# Patient Record
Sex: MALE | Race: WHITE
[De-identification: names, ages, dates, MRNs, and addresses within clinical notes are randomized per-mention and may not be internally consistent; named-entity substitution may affect disease eponyms.]

---

## 2023-02-01 ENCOUNTER — HOSPITAL ENCOUNTER (OUTPATIENT)
Dept: HOSPITAL 100 - OT | Age: 63
Discharge: HOME | End: 2023-02-01
Payer: MEDICAID

## 2023-02-01 DIAGNOSIS — I89.0: Primary | ICD-10-CM

## 2023-02-01 PROCEDURE — 97530 THERAPEUTIC ACTIVITIES: CPT

## 2023-02-01 PROCEDURE — 97166 OT EVAL MOD COMPLEX 45 MIN: CPT

## 2023-02-24 LAB
ALANINE AMINOTRANSFERASE (SGPT) (U/L) IN SER/PLAS: 30 U/L (ref 10–52)
ALBUMIN (G/DL) IN SER/PLAS: 4.3 G/DL (ref 3.4–5)
ALKALINE PHOSPHATASE (U/L) IN SER/PLAS: 70 U/L (ref 33–136)
ANION GAP IN SER/PLAS: 13 MMOL/L (ref 10–20)
ASPARTATE AMINOTRANSFERASE (SGOT) (U/L) IN SER/PLAS: 28 U/L (ref 9–39)
BASOPHILS (10*3/UL) IN BLOOD BY AUTOMATED COUNT: 0.05 X10E9/L (ref 0–0.1)
BASOPHILS/100 LEUKOCYTES IN BLOOD BY AUTOMATED COUNT: 1.1 % (ref 0–2)
BILIRUBIN TOTAL (MG/DL) IN SER/PLAS: 0.6 MG/DL (ref 0–1.2)
CALCIUM (MG/DL) IN SER/PLAS: 9.7 MG/DL (ref 8.6–10.3)
CARBON DIOXIDE, TOTAL (MMOL/L) IN SER/PLAS: 26 MMOL/L (ref 21–32)
CHLORIDE (MMOL/L) IN SER/PLAS: 101 MMOL/L (ref 98–107)
CHOLESTEROL (MG/DL) IN SER/PLAS: 253 MG/DL (ref 0–199)
CHOLESTEROL IN HDL (MG/DL) IN SER/PLAS: 47 MG/DL
CHOLESTEROL/HDL RATIO: 5.4
COBALAMIN (VITAMIN B12) (PG/ML) IN SER/PLAS: 1309 PG/ML (ref 211–911)
CREATININE (MG/DL) IN SER/PLAS: 1.22 MG/DL (ref 0.5–1.3)
EOSINOPHILS (10*3/UL) IN BLOOD BY AUTOMATED COUNT: 0.13 X10E9/L (ref 0–0.7)
EOSINOPHILS/100 LEUKOCYTES IN BLOOD BY AUTOMATED COUNT: 2.8 % (ref 0–6)
ERYTHROCYTE DISTRIBUTION WIDTH (RATIO) BY AUTOMATED COUNT: 12.5 % (ref 11.5–14.5)
ERYTHROCYTE MEAN CORPUSCULAR HEMOGLOBIN CONCENTRATION (G/DL) BY AUTOMATED: 33.4 G/DL (ref 32–36)
ERYTHROCYTE MEAN CORPUSCULAR VOLUME (FL) BY AUTOMATED COUNT: 96 FL (ref 80–100)
ERYTHROCYTES (10*6/UL) IN BLOOD BY AUTOMATED COUNT: 4.8 X10E12/L (ref 4.5–5.9)
ESTIMATED AVERAGE GLUCOSE FOR HBA1C: 114 MG/DL
GFR MALE: 66 ML/MIN/1.73M2
GLUCOSE (MG/DL) IN SER/PLAS: 98 MG/DL (ref 74–99)
HEMATOCRIT (%) IN BLOOD BY AUTOMATED COUNT: 46.1 % (ref 41–52)
HEMOGLOBIN (G/DL) IN BLOOD: 15.4 G/DL (ref 13.5–17.5)
HEMOGLOBIN A1C/HEMOGLOBIN TOTAL IN BLOOD: 5.6 %
IMMATURE GRANULOCYTES/100 LEUKOCYTES IN BLOOD BY AUTOMATED COUNT: 0 % (ref 0–0.9)
LDL: 182 MG/DL (ref 0–99)
LEUKOCYTES (10*3/UL) IN BLOOD BY AUTOMATED COUNT: 4.6 X10E9/L (ref 4.4–11.3)
LYMPHOCYTES (10*3/UL) IN BLOOD BY AUTOMATED COUNT: 1.21 X10E9/L (ref 1.2–4.8)
LYMPHOCYTES/100 LEUKOCYTES IN BLOOD BY AUTOMATED COUNT: 26.4 % (ref 13–44)
MONOCYTES (10*3/UL) IN BLOOD BY AUTOMATED COUNT: 0.65 X10E9/L (ref 0.1–1)
MONOCYTES/100 LEUKOCYTES IN BLOOD BY AUTOMATED COUNT: 14.2 % (ref 2–10)
NEUTROPHILS (10*3/UL) IN BLOOD BY AUTOMATED COUNT: 2.55 X10E9/L (ref 1.2–7.7)
NEUTROPHILS/100 LEUKOCYTES IN BLOOD BY AUTOMATED COUNT: 55.5 % (ref 40–80)
PLATELETS (10*3/UL) IN BLOOD AUTOMATED COUNT: 225 X10E9/L (ref 150–450)
POTASSIUM (MMOL/L) IN SER/PLAS: 3.6 MMOL/L (ref 3.5–5.3)
PROTEIN TOTAL: 7.3 G/DL (ref 6.4–8.2)
SODIUM (MMOL/L) IN SER/PLAS: 136 MMOL/L (ref 136–145)
THYROTROPIN (MIU/L) IN SER/PLAS BY DETECTION LIMIT <= 0.05 MIU/L: 6.2 MIU/L (ref 0.44–3.98)
THYROXINE (T4) FREE (NG/DL) IN SER/PLAS: 0.98 NG/DL (ref 0.61–1.12)
TRIGLYCERIDE (MG/DL) IN SER/PLAS: 118 MG/DL (ref 0–149)
UREA NITROGEN (MG/DL) IN SER/PLAS: 18 MG/DL (ref 6–23)
VLDL: 24 MG/DL (ref 0–40)

## 2023-06-27 ENCOUNTER — APPOINTMENT (OUTPATIENT)
Dept: PRIMARY CARE | Facility: CLINIC | Age: 63
End: 2023-06-27

## 2023-06-27 LAB
COBALAMIN (VITAMIN B12) (PG/ML) IN SER/PLAS: 517 PG/ML (ref 211–911)
THYROTROPIN (MIU/L) IN SER/PLAS BY DETECTION LIMIT <= 0.05 MIU/L: 8.38 MIU/L (ref 0.44–3.98)
THYROXINE (T4) FREE (NG/DL) IN SER/PLAS: 0.83 NG/DL (ref 0.61–1.12)

## 2023-06-28 PROBLEM — M91.90: Status: ACTIVE | Noted: 2023-06-28

## 2023-06-28 PROBLEM — M25.511 RIGHT SHOULDER PAIN: Status: ACTIVE | Noted: 2023-06-28

## 2023-06-28 PROBLEM — R21 RASH: Status: ACTIVE | Noted: 2023-06-28

## 2023-06-28 PROBLEM — J03.90 TONSILLITIS: Status: ACTIVE | Noted: 2023-06-28

## 2023-06-28 PROBLEM — E53.8 LOW SERUM VITAMIN B12: Status: ACTIVE | Noted: 2023-06-28

## 2023-06-28 PROBLEM — R05.9 COUGH: Status: ACTIVE | Noted: 2023-06-28

## 2023-06-28 PROBLEM — M25.562 LEFT KNEE PAIN: Status: ACTIVE | Noted: 2023-06-28

## 2023-06-28 PROBLEM — H40.9 GLAUCOMA: Status: ACTIVE | Noted: 2023-06-28

## 2023-06-28 PROBLEM — M91.10 LEGG-CALVE-PERTHES DISEASE (HHS-HCC): Status: ACTIVE | Noted: 2023-06-28

## 2023-06-28 PROBLEM — R25.2 MUSCLE CRAMPS: Status: ACTIVE | Noted: 2023-06-28

## 2023-06-28 PROBLEM — R79.89 ELEVATED FERRITIN: Status: ACTIVE | Noted: 2023-06-28

## 2023-06-28 PROBLEM — J43.9 BLEB, LUNG (MULTI): Status: ACTIVE | Noted: 2023-06-28

## 2023-06-28 PROBLEM — E78.00 HYPERCHOLESTEROLEMIA: Status: ACTIVE | Noted: 2023-06-28

## 2023-06-28 PROBLEM — M54.50 LOW BACK PAIN: Status: ACTIVE | Noted: 2023-06-28

## 2023-06-28 PROBLEM — L23.7 POISON IVY: Status: ACTIVE | Noted: 2023-06-28

## 2023-06-28 PROBLEM — I49.9 IRREGULAR HEART RATE: Status: ACTIVE | Noted: 2023-06-28

## 2023-06-28 PROBLEM — E03.9 HYPOTHYROIDISM: Status: ACTIVE | Noted: 2023-06-28

## 2023-06-28 PROBLEM — J02.9 SORE THROAT: Status: ACTIVE | Noted: 2023-06-28

## 2023-06-28 PROBLEM — E66.9 OBESITY: Status: ACTIVE | Noted: 2023-06-28

## 2023-06-28 PROBLEM — L25.5 DERMATITIS DUE TO PLANTS, INCLUDING POISON IVY, SUMAC, AND OAK: Status: ACTIVE | Noted: 2023-06-28

## 2023-06-28 PROBLEM — I10 HYPERTENSION: Status: ACTIVE | Noted: 2023-06-28

## 2023-06-28 PROBLEM — R60.0 LOWER EXTREMITY EDEMA: Status: ACTIVE | Noted: 2023-06-28

## 2023-06-28 PROBLEM — Z86.010 HISTORY OF COLON POLYPS: Status: ACTIVE | Noted: 2023-06-28

## 2023-06-28 PROBLEM — S22.39XA RIB FRACTURE: Status: ACTIVE | Noted: 2023-06-28

## 2023-06-28 PROBLEM — R73.02 GLUCOSE INTOLERANCE (IMPAIRED GLUCOSE TOLERANCE): Status: ACTIVE | Noted: 2023-06-28

## 2023-06-28 PROBLEM — Z86.0100 HISTORY OF COLON POLYPS: Status: ACTIVE | Noted: 2023-06-28

## 2023-06-28 PROBLEM — H57.9 ITCHY EYES: Status: ACTIVE | Noted: 2023-06-28

## 2023-06-28 RX ORDER — MULTIVITAMIN
1 TABLET ORAL DAILY
COMMUNITY

## 2023-06-28 RX ORDER — LOSARTAN POTASSIUM 50 MG/1
50 TABLET ORAL 2 TIMES DAILY
COMMUNITY
Start: 2021-04-19 | End: 2024-05-22 | Stop reason: SDUPTHER

## 2023-06-28 RX ORDER — LANOLIN ALCOHOL/MO/W.PET/CERES
CREAM (GRAM) TOPICAL DAILY
COMMUNITY
End: 2023-10-31 | Stop reason: SDUPTHER

## 2023-06-28 RX ORDER — LATANOPROST 50 UG/ML
SOLUTION/ DROPS OPHTHALMIC
COMMUNITY

## 2023-06-28 RX ORDER — LEVOTHYROXINE SODIUM 25 UG/1
25 TABLET ORAL DAILY
COMMUNITY
Start: 2022-08-03 | End: 2024-03-11

## 2023-06-28 RX ORDER — DORZOLAMIDE HCL 20 MG/ML
SOLUTION/ DROPS OPHTHALMIC 2 TIMES DAILY
COMMUNITY

## 2023-06-28 RX ORDER — LEVOTHYROXINE SODIUM 200 UG/1
200 TABLET ORAL DAILY
COMMUNITY
End: 2023-07-07 | Stop reason: SDUPTHER

## 2023-06-28 RX ORDER — HYDROCHLOROTHIAZIDE 50 MG/1
50 TABLET ORAL DAILY
COMMUNITY
Start: 2021-02-25 | End: 2024-03-11

## 2023-06-29 ENCOUNTER — OFFICE VISIT (OUTPATIENT)
Dept: PRIMARY CARE | Facility: CLINIC | Age: 63
End: 2023-06-29
Payer: COMMERCIAL

## 2023-06-29 VITALS
DIASTOLIC BLOOD PRESSURE: 80 MMHG | OXYGEN SATURATION: 97 % | HEIGHT: 71 IN | BODY MASS INDEX: 43.54 KG/M2 | WEIGHT: 311 LBS | SYSTOLIC BLOOD PRESSURE: 136 MMHG | HEART RATE: 89 BPM

## 2023-06-29 DIAGNOSIS — R79.89 ELEVATED FERRITIN: ICD-10-CM

## 2023-06-29 DIAGNOSIS — I10 PRIMARY HYPERTENSION: ICD-10-CM

## 2023-06-29 DIAGNOSIS — J30.2 SEASONAL ALLERGIES: ICD-10-CM

## 2023-06-29 DIAGNOSIS — M91.11 JUVENILE OSTEOCHONDROSIS OF HEAD OF RIGHT FEMUR (HHS-HCC): ICD-10-CM

## 2023-06-29 DIAGNOSIS — E78.00 HYPERCHOLESTEROLEMIA: ICD-10-CM

## 2023-06-29 DIAGNOSIS — Z12.5 SCREENING PSA (PROSTATE SPECIFIC ANTIGEN): ICD-10-CM

## 2023-06-29 DIAGNOSIS — E03.9 ACQUIRED HYPOTHYROIDISM: Primary | ICD-10-CM

## 2023-06-29 DIAGNOSIS — R21 RASH: ICD-10-CM

## 2023-06-29 DIAGNOSIS — R60.0 LOWER EXTREMITY EDEMA: ICD-10-CM

## 2023-06-29 DIAGNOSIS — E66.01 CLASS 3 SEVERE OBESITY DUE TO EXCESS CALORIES WITH SERIOUS COMORBIDITY AND BODY MASS INDEX (BMI) OF 40.0 TO 44.9 IN ADULT (MULTI): ICD-10-CM

## 2023-06-29 DIAGNOSIS — E53.8 LOW SERUM VITAMIN B12: ICD-10-CM

## 2023-06-29 PROBLEM — J03.90 TONSILLITIS: Status: RESOLVED | Noted: 2023-06-28 | Resolved: 2023-06-29

## 2023-06-29 PROBLEM — J02.9 SORE THROAT: Status: RESOLVED | Noted: 2023-06-28 | Resolved: 2023-06-29

## 2023-06-29 PROBLEM — H40.1131 PRIMARY OPEN-ANGLE GLAUCOMA, BILATERAL, MILD STAGE: Status: ACTIVE | Noted: 2017-02-11

## 2023-06-29 PROBLEM — E66.813 CLASS 3 SEVERE OBESITY DUE TO EXCESS CALORIES WITH SERIOUS COMORBIDITY AND BODY MASS INDEX (BMI) OF 40.0 TO 44.9 IN ADULT: Status: ACTIVE | Noted: 2023-06-28

## 2023-06-29 PROBLEM — J43.9 BLEB, LUNG (MULTI): Status: RESOLVED | Noted: 2023-06-28 | Resolved: 2023-06-29

## 2023-06-29 PROBLEM — H57.9 ITCHY EYES: Status: RESOLVED | Noted: 2023-06-28 | Resolved: 2023-06-29

## 2023-06-29 PROBLEM — R05.9 COUGH: Status: RESOLVED | Noted: 2023-06-28 | Resolved: 2023-06-29

## 2023-06-29 PROBLEM — S22.39XA RIB FRACTURE: Status: RESOLVED | Noted: 2023-06-28 | Resolved: 2023-06-29

## 2023-06-29 PROBLEM — L25.5 DERMATITIS DUE TO PLANTS, INCLUDING POISON IVY, SUMAC, AND OAK: Status: RESOLVED | Noted: 2023-06-28 | Resolved: 2023-06-29

## 2023-06-29 PROBLEM — L23.7 POISON IVY: Status: RESOLVED | Noted: 2023-06-28 | Resolved: 2023-06-29

## 2023-06-29 PROCEDURE — 3075F SYST BP GE 130 - 139MM HG: CPT | Performed by: PHYSICIAN ASSISTANT

## 2023-06-29 PROCEDURE — 99214 OFFICE O/P EST MOD 30 MIN: CPT | Performed by: PHYSICIAN ASSISTANT

## 2023-06-29 PROCEDURE — 3008F BODY MASS INDEX DOCD: CPT | Performed by: PHYSICIAN ASSISTANT

## 2023-06-29 PROCEDURE — 1036F TOBACCO NON-USER: CPT | Performed by: PHYSICIAN ASSISTANT

## 2023-06-29 PROCEDURE — 3079F DIAST BP 80-89 MM HG: CPT | Performed by: PHYSICIAN ASSISTANT

## 2023-06-29 RX ORDER — CLOTRIMAZOLE AND BETAMETHASONE DIPROPIONATE 10; .64 MG/G; MG/G
1 CREAM TOPICAL 2 TIMES DAILY PRN
Qty: 45 G | Refills: 1 | Status: SHIPPED | OUTPATIENT
Start: 2023-06-29 | End: 2023-10-27

## 2023-06-29 RX ORDER — FUROSEMIDE 20 MG/1
20 TABLET ORAL 2 TIMES DAILY
COMMUNITY
Start: 2022-12-27

## 2023-06-29 RX ORDER — DOXYCYCLINE 100 MG/1
100 CAPSULE ORAL 2 TIMES DAILY
COMMUNITY
Start: 2023-01-13 | End: 2023-09-07 | Stop reason: ALTCHOICE

## 2023-06-29 RX ORDER — CEPHALEXIN 500 MG/1
500 CAPSULE ORAL 4 TIMES DAILY
COMMUNITY
Start: 2022-12-23 | End: 2023-09-07 | Stop reason: ALTCHOICE

## 2023-06-29 RX ORDER — MUPIROCIN CALCIUM 20 MG/G
CREAM TOPICAL
COMMUNITY
Start: 2022-12-23

## 2023-06-29 RX ORDER — POTASSIUM CHLORIDE 1500 MG/1
20 TABLET, EXTENDED RELEASE ORAL DAILY
COMMUNITY
Start: 2023-03-24 | End: 2023-10-31 | Stop reason: ALTCHOICE

## 2023-06-29 RX ORDER — FLUTICASONE PROPIONATE 50 MCG
1 SPRAY, SUSPENSION (ML) NASAL DAILY PRN
Qty: 16 G | Refills: 5 | Status: SHIPPED | OUTPATIENT
Start: 2023-06-29 | End: 2024-06-28

## 2023-06-29 ASSESSMENT — PATIENT HEALTH QUESTIONNAIRE - PHQ9
1. LITTLE INTEREST OR PLEASURE IN DOING THINGS: NOT AT ALL
2. FEELING DOWN, DEPRESSED OR HOPELESS: NOT AT ALL
SUM OF ALL RESPONSES TO PHQ9 QUESTIONS 1 AND 2: 0

## 2023-06-29 ASSESSMENT — ENCOUNTER SYMPTOMS
VOMITING: 0
FLANK PAIN: 0
CHEST TIGHTNESS: 0
ARTHRALGIAS: 1
WOUND: 0
SINUS PAIN: 0
EYE DISCHARGE: 0
CONFUSION: 0
SHORTNESS OF BREATH: 0
WHEEZING: 0
PALPITATIONS: 0
FREQUENCY: 0
CONSTIPATION: 0
SORE THROAT: 0
NAUSEA: 0
CHILLS: 0
BACK PAIN: 0
FEVER: 0
EYE REDNESS: 0
BRUISES/BLEEDS EASILY: 0
DIARRHEA: 0
NUMBNESS: 0
ABDOMINAL PAIN: 0
DIZZINESS: 0
SLEEP DISTURBANCE: 0
NECK PAIN: 0
RHINORRHEA: 0
COUGH: 0
HEADACHES: 0
TREMORS: 0
FATIGUE: 1

## 2023-06-29 NOTE — PROGRESS NOTES
Subjective   Patient ID: Devon Medina is a 63 y.o. male who presents for Follow-up (3-4 MONTHS LABS )    HPI     to review labs      Med check   hypothyroid -taking meds - will inc to 225 mcg daily (was taking 225 M, W, F and 200 other days)  HTN - home readings are typically 110-130s/70-80  mag - cont on supplement   glaucoma - on drop and following with dr colin fu- R - stable- following with ortho/pod on occasion - pt feels it is getting worse - leg /foot cramping and discomfort . He denies following with ortho or pod in sometime and states he is dealing with so much pain he is needing to take ibuprofen 600-800 at bedtime to help him get out of bed the next morning   elevated ferritin - additional labs done and he was referred to kenneth who he is following with at this time.   LE edema /lymphedmea /chronic venous insuff - following with lymphedema clinic in [place] and has moses hose and overall back to good baseline - not on lasix/K at this time - discussed water therapy but didn't want to do in [place] - is worried about the cost    Rash on L leg - lateral knee   Circular with central clearing   OTC cream but little relief     Preventative testing   PSA - Jan 2022  colonoscopy - oct 2022 -polyps - repeat in 3 years   Depression Screen - PHQ2 NEG June 2023  Fall - NEG JUNE 2023     flu shot given in office this fall 2022          Patient Active Problem List   Diagnosis    Bleb, lung (CMS/HCC)    Cough    Dermatitis due to plants, including poison ivy, sumac, and oak    Elevated ferritin    Glaucoma    Glucose intolerance (impaired glucose tolerance)    History of colon polyps    Hypercholesterolemia    Hypertension    Hypothyroidism    Irregular heart rate    Itchy eyes    Juvenile osteochondrosis of hip and pelvis    Left knee pain    Yfiy-Gcnhy-Qfbgjfd disease    Low back pain    Low serum vitamin B12    Lower extremity edema    Muscle cramps    Obesity    Poison ivy    Rash    Rib fracture     Right shoulder pain    Sore throat    Tonsillitis       Review of Systems   Constitutional:  Positive for fatigue. Negative for chills and fever.   HENT:  Positive for congestion. Negative for rhinorrhea, sinus pain, sore throat and tinnitus.    Eyes:  Negative for discharge, redness and visual disturbance.   Respiratory:  Negative for cough, chest tightness, shortness of breath and wheezing.    Cardiovascular:  Negative for chest pain, palpitations and leg swelling.   Gastrointestinal:  Negative for abdominal pain, constipation, diarrhea, nausea and vomiting.   Endocrine: Negative for cold intolerance and heat intolerance.   Genitourinary:  Negative for flank pain, frequency and urgency.   Musculoskeletal:  Positive for arthralgias and gait problem. Negative for back pain and neck pain.   Skin:  Positive for rash. Negative for wound.   Neurological:  Negative for dizziness, tremors, syncope, numbness and headaches.   Hematological:  Does not bruise/bleed easily.   Psychiatric/Behavioral:  Negative for confusion, sleep disturbance and suicidal ideas.        Past Medical History:   Diagnosis Date    Encounter for examination of eyes and vision without abnormal findings     Diabetic eye exam    Encounter for screening for malignant neoplasm of prostate 06/30/2021    Screening PSA (prostate specific antigen)       Past Surgical History:   Procedure Laterality Date    OTHER SURGICAL HISTORY  10/12/2020    [place] tooth extraction    OTHER SURGICAL HISTORY  10/12/2020    Lung lobectomy    OTHER SURGICAL HISTORY  10/12/2020    Testicular torsion repair    OTHER SURGICAL HISTORY  07/28/2021    Colonoscopy       Family History   Problem Relation Name Age of Onset    Hypertension Mother      Diabetes Mother      Thyroid disease Mother      Hypertension Father      Diabetes Father         Social History     Tobacco Use    Smoking status: Former     Types: Cigarettes    Smokeless tobacco: Never   Vaping Use    Vaping Use: Never  "used   Substance Use Topics    Alcohol use: Yes    Drug use: Never       Allergies   Allergen Reactions    Itraconazole Hives       Current Outpatient Medications   Medication Sig Dispense Refill    dorzolamide (Trusopt) 2 % ophthalmic solution Administer into affected eye(s) twice a day.      furosemide (Lasix) 20 mg tablet Take 1 tablet (20 mg) by mouth 2 times a day.      hydroCHLOROthiazide (HYDRODiuril) 50 mg tablet Take 1 tablet (50 mg) by mouth once daily.      latanoprost (Xalatan) 0.005 % ophthalmic solution Administer into affected eye(s).      levothyroxine (Synthroid, Levoxyl) 200 mcg tablet Take 1 tablet (200 mcg) by mouth once daily.      levothyroxine (Synthroid, Levoxyl) 25 mcg tablet Take 1 tablet (25 mcg) by mouth once daily.      MAGNESIUM CARBONATE ORAL Take by mouth.      multivit-min/ferrous fumarate (MULTI VITAMIN ORAL) Take 1 tablet by mouth once daily.      multivitamin tablet Take 1 tablet by mouth once daily.      mupirocin (Bactroban) 2 % cream APPLY TO THE AFFECTED AREA(S) THREE TIMES DAILY      cephalexin (Keflex) 500 mg capsule Take 1 capsule (500 mg) by mouth 4 times a day.      cyanocobalamin (Vitamin B-12) 1,000 mcg tablet Take by mouth once daily.      doxycycline (Vibramycin) 100 mg capsule Take 1 capsule (100 mg) by mouth 2 times a day.      losartan (Cozaar) 50 mg tablet Take 1 tablet (50 mg) by mouth once daily.      potassium chloride CR (K-Tab) 20 mEq ER tablet Take 1 tablet (20 mEq) by mouth once daily.       No current facility-administered medications for this visit.       Objective   /81 (BP Location: Left arm, Patient Position: Sitting)   Pulse 89   Ht 1.803 m (5' 11\")   Wt 141 kg (311 lb)   SpO2 97%   BMI 43.38 kg/m²     Physical Exam  Vitals reviewed.   Constitutional:       Appearance: Normal appearance. He is obese.   HENT:      Head: Normocephalic.      Right Ear: External ear normal.      Left Ear: External ear normal.      Nose: Nose normal. No " congestion or rhinorrhea.      Mouth/Throat:      Mouth: Mucous membranes are moist.   Eyes:      Extraocular Movements: Extraocular movements intact.      Conjunctiva/sclera: Conjunctivae normal.      Pupils: Pupils are equal, round, and reactive to light.   Cardiovascular:      Rate and Rhythm: Normal rate and regular rhythm.      Pulses: Normal pulses.   Pulmonary:      Effort: Pulmonary effort is normal.      Breath sounds: Normal breath sounds.   Abdominal:      General: Bowel sounds are normal.      Palpations: Abdomen is soft.      Tenderness: There is no abdominal tenderness. There is no right CVA tenderness or left CVA tenderness.   Musculoskeletal:         General: No tenderness. Normal range of motion.      Cervical back: Normal range of motion and neck supple. No tenderness.   Skin:     General: Skin is warm and dry.      Findings: Rash present.   Neurological:      General: No focal deficit present.      Mental Status: He is alert and oriented to person, place, and time.   Psychiatric:         Mood and Affect: Mood normal.         Behavior: Behavior normal.         Testing   Component      Latest Ref [facility] 6/27/2023   WBC      4.4 - 11.3 x10E9/L 5.2    RBC      4.50 - 5.90 x10E12/L 4.72    HEMOGLOBIN      13.5 - 17.5 g/dL 15.3    HEMATOCRIT      41.0 - 52.0 % 45.8    MCV      80 - 100 fL 97    MCHC      32.0 - 36.0 g/dL 33.4    Platelets      150 - 450 x10E9/L 220    RED CELL DISTRIBUTION WIDTH      11.5 - 14.5 % 12.6    Neutrophils %      40.0 - 80.0 % 53.8    Immature Granulocytes %, Automated      0.0 - 0.9 % 0.4    Lymphocytes %      13.0 - 44.0 % 29.6    Monocytes %      2.0 - 10.0 % 10.9    Eosinophils %      0.0 - 6.0 % 4.0    Basophils %      0.0 - 2.0 % 1.3    Neutrophils Absolute      1.20 - 7.70 x10E9/L 2.82    Lymphocytes Absolute      1.20 - 4.80 x10E9/L 1.55    Monocytes Absolute      0.10 - 1.00 x10E9/L 0.57    Eosinophils Absolute      0.00 - 0.70 x10E9/L 0.21    Basophils Absolute       0.00 - 0.10 x10E9/L 0.07    GLUCOSE      74 - 99 mg/dL 91    SODIUM      136 - 145 mmol/L 137    POTASSIUM      3.5 - 5.3 mmol/L 3.6    CHLORIDE      98 - 107 mmol/L 104    Bicarbonate      21 - 32 mmol/L 23    Anion Gap      10 - 20 mmol/L 14    Blood Urea Nitrogen      6 - 23 mg/dL 22    Creatinine      0.50 - 1.30 mg/dL 1.09    GFR MALE      >90 mL/min/1.73m2 76    Calcium      8.6 - 10.3 mg/dL 9.4    Albumin      3.4 - 5.0 g/dL 4.3    Alkaline Phosphatase      33 - 136 U/L 74    Total Protein      6.4 - 8.2 g/dL 7.0    AST      9 - 39 U/L 23    Bilirubin Total      0.0 - 1.2 mg/dL 0.8    ALT      10 - 52 U/L 26    Thyroid Stimulating Hormone      0.44 - 3.98 mIU/L 8.38 (H)    Thyroxine, Free      0.61 - 1.12 ng/dL 0.83    Vitamin B12      211 - 911 pg/mL 517    FERRITIN      20 - 300 ug/L 444 (H)         Impression    MDM    1) COMPLEXITY: MORE THAN 1 STABLE CHRONIC CONDITION ADDRESSED  2)DATA: TESTS INTERPRETED AND OR ORDERED, TOOK INDEPENDENT HISTORY OR RECORDS REVIEWED  3)RISK: MODERATE RISK DUE TO NATURE OF MEDICAL CONDITIONS/COMORBIDITY OR MEDICATIONS ORDERED OR SURGICAL OR PROCEDURE REFERRAL, .       Reviewed labs and Testing on file   Patient to follow diet low in cholesterol, fat, and sodium.    Patient is advised to increase Exercise.  Patient is recommended to lose weight.  Reviewed Meds and discussed common side effects  Continue as directed   Thyroid- dose adjusted - see hPI   Elevated ferritin - following with heme  Allergies - discussed otc oral but will start on flonase  Rash - characteristic of tinea corporis = will try lotrisone    Patient is strongly advised to be compliant with recommendations.    Return to Clinic sooner if needed.  Patient denies further questions/concerns at this time     Assessment/Plan   Problem List Items Addressed This Visit       Elevated ferritin    Hypercholesterolemia    Relevant Orders    CBC and Auto Differential    Comprehensive Metabolic Panel    Lipid Panel     Magnesium    Vitamin B12    Hypertension    Hypothyroidism - Primary    Relevant Orders    Thyroid Stimulating Hormone    Thyroxine, Free    Magnesium    Vitamin B12    Lgje-Ardlm-Tomtrek disease     Hx of leg calve perthes         Low serum vitamin B12    Relevant Orders    Magnesium    Vitamin B12    Lower extremity edema    Class 3 severe obesity due to excess calories with serious comorbidity and body mass index (BMI) of 40.0 to 44.9 in adult (CMS/[facility])    Rash    Relevant Medications    clotrimazole-betamethasone (Lotrisone) cream     Other Visit Diagnoses       Screening PSA (prostate specific antigen)        Relevant Orders    Prostate Specific Antigen, Screen    Seasonal allergies        Relevant Medications    fluticasone (Flonase) 50 mcg/actuation nasal spray          FU in 3-4 mo with labs at [facility] fasting and med check

## 2023-07-07 DIAGNOSIS — E03.9 ACQUIRED HYPOTHYROIDISM: ICD-10-CM

## 2023-07-10 RX ORDER — LEVOTHYROXINE SODIUM 200 UG/1
200 TABLET ORAL DAILY
Qty: 30 TABLET | Refills: 11 | Status: SHIPPED | OUTPATIENT
Start: 2023-07-10

## 2023-07-12 DIAGNOSIS — E03.9 ACQUIRED HYPOTHYROIDISM: ICD-10-CM

## 2023-07-12 RX ORDER — LEVOTHYROXINE SODIUM 200 UG/1
200 TABLET ORAL DAILY
Qty: 30 TABLET | Refills: 11 | OUTPATIENT
Start: 2023-07-12

## 2023-09-07 ENCOUNTER — TELEMEDICINE (OUTPATIENT)
Dept: PRIMARY CARE | Facility: CLINIC | Age: 63
End: 2023-09-07
Payer: COMMERCIAL

## 2023-09-07 DIAGNOSIS — H93.13 TINNITUS OF BOTH EARS: ICD-10-CM

## 2023-09-07 DIAGNOSIS — H92.03 OTALGIA OF BOTH EARS: Primary | ICD-10-CM

## 2023-09-07 PROCEDURE — 99214 OFFICE O/P EST MOD 30 MIN: CPT | Performed by: INTERNAL MEDICINE

## 2023-09-07 RX ORDER — NEOMYCIN SULFATE, POLYMYXIN B SULFATE, HYDROCORTISONE 3.5; 10000; 1 MG/ML; [USP'U]/ML; MG/ML
2 SOLUTION/ DROPS AURICULAR (OTIC) 4 TIMES DAILY
Qty: 2.8 ML | Refills: 0 | Status: SHIPPED | OUTPATIENT
Start: 2023-09-07 | End: 2023-09-14

## 2023-09-07 ASSESSMENT — ENCOUNTER SYMPTOMS
WHEEZING: 0
BACK PAIN: 0
VOMITING: 0
NEUROLOGICAL NEGATIVE: 1
ABDOMINAL DISTENTION: 0
AGITATION: 0
GASTROINTESTINAL NEGATIVE: 1
NAUSEA: 0
PALPITATIONS: 0
FEVER: 0
COUGH: 0
DYSURIA: 0
CHILLS: 0
LIGHT-HEADEDNESS: 0
ENDOCRINE NEGATIVE: 1
PSYCHIATRIC NEGATIVE: 1
DIARRHEA: 0
MUSCULOSKELETAL NEGATIVE: 1
HEADACHES: 0
EYES NEGATIVE: 1
DIZZINESS: 0
WEAKNESS: 0
CARDIOVASCULAR NEGATIVE: 1
RHINORRHEA: 0
CONSTIPATION: 0
SHORTNESS OF BREATH: 0
ABDOMINAL PAIN: 0

## 2023-09-07 NOTE — PROGRESS NOTES
Subjective   Patient ID: Devon Medina is a 63 y.o. male who presents for Follow-up (B/L EARS RINGING AND HAVING A HARD TIME HEARING. WIFE RECENTLY IN HOSPITAL FOR COVID).  HPI  VIRTUAL VISIT  CHRONIC TINNITUS OF BOTH EARS WITH RECENT OTALGIA 4/10 ON AND OFF . WIFE WAS RECENTLY DISCHARGE FROM HOSPITAL FOR COVID . PT HAD NEGATIVE HOME COVID TEST. DENIES HAVING CONGESTION.  Review of Systems   Constitutional:  Negative for chills and fever.   HENT:  Positive for tinnitus. Negative for congestion, postnasal drip and rhinorrhea.         B/L OTALGIA   Eyes: Negative.  Negative for visual disturbance.   Respiratory:  Negative for cough, shortness of breath and wheezing.    Cardiovascular: Negative.  Negative for chest pain, palpitations and leg swelling.   Gastrointestinal: Negative.  Negative for abdominal distention, abdominal pain, constipation, diarrhea, nausea and vomiting.   Endocrine: Negative.    Genitourinary:  Negative for dysuria and urgency.   Musculoskeletal: Negative.  Negative for back pain.   Skin: Negative.  Negative for rash.   Allergic/Immunologic: Negative for immunocompromised state.   Neurological: Negative.  Negative for dizziness, weakness, light-headedness and headaches.   Psychiatric/Behavioral: Negative.  Negative for agitation.        Objective   Physical Exam  Constitutional:       General: He is not in acute distress.     Appearance: He is not ill-appearing.   Neurological:      Mental Status: He is alert.         Assessment/Plan   1. Otalgia of both ears  neomycin-polymyxin-HC (Cortisporin) otic solution      2. Tinnitus of both ears        ADVISED TO AVOID USING Q-TIPS AND TO AVOID CLEANING THE EARS FREQUENTLY .  ADVISED TO AVOID TAKING THE OTC ASA WITH HAVING TINNITUS.  ADVISED TO HAVE LOW FAT AND LOW CALORIE DIET AND TO LOOSE WEIGHT, DAILY EXERCISE.    MDM    1) COMPLEXITY: 1 UNDIAGNOSED NEW PROBLEM WITH UNCERTAIN PROGNOSIS  2)DATA: TESTS INTERPRETED AND OR ORDERED, TOOK INDEPENDENT  HISTORY OR RECORDS REVIEWED  3)RISK: MODERATE RISK DUE TO NATURE OF MEDICAL CONDITIONS/COMORBIDITY OR MEDICATIONS ORDERED OR SURGICAL OR PROCEDURE REFERRAL, .         3-4 weeks IN PERSON WITH PCP.

## 2023-09-28 ENCOUNTER — OFFICE VISIT (OUTPATIENT)
Dept: PRIMARY CARE | Facility: CLINIC | Age: 63
End: 2023-09-28
Payer: COMMERCIAL

## 2023-09-28 VITALS
DIASTOLIC BLOOD PRESSURE: 82 MMHG | HEART RATE: 82 BPM | HEIGHT: 71 IN | SYSTOLIC BLOOD PRESSURE: 123 MMHG | BODY MASS INDEX: 43.26 KG/M2 | WEIGHT: 309 LBS

## 2023-09-28 DIAGNOSIS — H93.13 TINNITUS AURIUM, BILATERAL: ICD-10-CM

## 2023-09-28 DIAGNOSIS — I10 PRIMARY HYPERTENSION: ICD-10-CM

## 2023-09-28 DIAGNOSIS — Z23 NEED FOR INFLUENZA VACCINATION: ICD-10-CM

## 2023-09-28 DIAGNOSIS — H93.8X3 EAR CONGESTION, BILATERAL: ICD-10-CM

## 2023-09-28 DIAGNOSIS — R73.02 GLUCOSE INTOLERANCE (IMPAIRED GLUCOSE TOLERANCE): ICD-10-CM

## 2023-09-28 DIAGNOSIS — E66.01 CLASS 3 SEVERE OBESITY DUE TO EXCESS CALORIES WITH SERIOUS COMORBIDITY AND BODY MASS INDEX (BMI) OF 40.0 TO 44.9 IN ADULT (MULTI): ICD-10-CM

## 2023-09-28 DIAGNOSIS — J01.00 ACUTE NON-RECURRENT MAXILLARY SINUSITIS: Primary | ICD-10-CM

## 2023-09-28 PROCEDURE — 3008F BODY MASS INDEX DOCD: CPT | Performed by: PHYSICIAN ASSISTANT

## 2023-09-28 PROCEDURE — 99214 OFFICE O/P EST MOD 30 MIN: CPT | Performed by: PHYSICIAN ASSISTANT

## 2023-09-28 PROCEDURE — 3079F DIAST BP 80-89 MM HG: CPT | Performed by: PHYSICIAN ASSISTANT

## 2023-09-28 PROCEDURE — 1036F TOBACCO NON-USER: CPT | Performed by: PHYSICIAN ASSISTANT

## 2023-09-28 PROCEDURE — 90471 IMMUNIZATION ADMIN: CPT | Performed by: PHYSICIAN ASSISTANT

## 2023-09-28 PROCEDURE — 3074F SYST BP LT 130 MM HG: CPT | Performed by: PHYSICIAN ASSISTANT

## 2023-09-28 PROCEDURE — 90686 IIV4 VACC NO PRSV 0.5 ML IM: CPT | Performed by: PHYSICIAN ASSISTANT

## 2023-09-28 RX ORDER — AMOXICILLIN AND CLAVULANATE POTASSIUM 875; 125 MG/1; MG/1
875 TABLET, FILM COATED ORAL 2 TIMES DAILY
Qty: 20 TABLET | Refills: 0 | Status: SHIPPED | OUTPATIENT
Start: 2023-09-28 | End: 2023-10-08

## 2023-09-28 ASSESSMENT — ENCOUNTER SYMPTOMS
ADENOPATHY: 1
SINUS PAIN: 0
NAUSEA: 0
BRUISES/BLEEDS EASILY: 0
WOUND: 0
PALPITATIONS: 0
CHILLS: 0
ARTHRALGIAS: 1
DIZZINESS: 0
EYE REDNESS: 0
DIARRHEA: 0
SORE THROAT: 1
RHINORRHEA: 0
NUMBNESS: 0
FATIGUE: 1
NECK PAIN: 0
EYE DISCHARGE: 0
CHEST TIGHTNESS: 0
SINUS PRESSURE: 1
BACK PAIN: 1
FREQUENCY: 0
FEVER: 0
VOMITING: 0
WHEEZING: 0
SHORTNESS OF BREATH: 0
CONSTIPATION: 0
FLANK PAIN: 0
SLEEP DISTURBANCE: 0
CONFUSION: 0
HEADACHES: 0
TREMORS: 0
ABDOMINAL PAIN: 0
COUGH: 0

## 2023-09-28 NOTE — PROGRESS NOTES
Subjective   Patient ID: Devon Medina is a 63 y.o. male who presents for Follow-up (3/4 WEEK FOLLOW UP C/O PAIN IN BOTH EARS. RINGING/MUFFLED IN BOTH EARS (MORE IN RIGHT THAN LEFT) FOR THE PAST COUPLE WEEKS.)    HPI    to review labs - not done - has a visit end of oct and is planning to do for that visit       Med check   hypothyroid -taking meds - will inc to 225 mcg daily (was taking 225 M, W, F and 200 other days)  HTN - home readings are typically 110-130s/70-80  mag - cont on supplement   glaucoma - on drop and following with dr colin leiva perthes- R - stable- following with ortho/pod on occasion - pt feels it is getting worse - leg /foot cramping and discomfort . He denies following with ortho or pod in sometime and states he is dealing with so much pain he is needing to take ibuprofen 600-800 at bedtime to help him get out of bed the next morning   elevated ferritin - additional labs done and he was referred to kenneth who he is following with at this time.   LE edema /lymphedmea /chronic venous insuff - following with lymphedema clinic in [place] and has moses hose and overall back to good baseline - not on lasix/K at this time - discussed water therapy but didn't want to do in [place] - is worried about the cost     Ear complaints  x few weeks   Tinnitus and dec hearing bilat   Wife had COVID but pt did a home test and was NEG with no other symptoms   Occasional pain and occasional discomfort in the lymph nodes tonsil  He was prescribed drops - not change in symptoms      Preventative testing   PSA - Jan 2022  colonoscopy - oct 2022 -polyps - repeat in 3 years   Depression Screen - PHQ2 NEG June 2023  Fall - NEG JUNE 2023      flu shot given in office sept 2023           Patient Active Problem List   Diagnosis    Elevated ferritin    Glaucoma    Glucose intolerance (impaired glucose tolerance)    History of colon polyps    Hypercholesterolemia    Hypertension    Hypothyroidism    Irregular heart rate     Left knee pain    Qwxx-Ozbep-Xddroeg disease    Low back pain    Low serum vitamin B12    Lower extremity edema    Muscle cramps    Class 3 severe obesity due to excess calories with serious comorbidity and body mass index (BMI) of 40.0 to 44.9 in adult (CMS/[facility])    Rash    Right shoulder pain    Primary open-angle glaucoma, bilateral, mild stage       Review of Systems   Constitutional:  Positive for fatigue. Negative for chills and fever.   HENT:  Positive for ear pain, sinus pressure, sore throat and tinnitus. Negative for congestion, rhinorrhea and sinus pain.    Eyes:  Negative for discharge, redness and visual disturbance.   Respiratory:  Negative for cough, chest tightness, shortness of breath and wheezing.    Cardiovascular:  Negative for chest pain, palpitations and leg swelling.   Gastrointestinal:  Negative for abdominal pain, constipation, diarrhea, nausea and vomiting.   Endocrine: Negative for cold intolerance and heat intolerance.   Genitourinary:  Negative for flank pain, frequency and urgency.   Musculoskeletal:  Positive for arthralgias, back pain and gait problem. Negative for neck pain.   Skin:  Negative for rash and wound.   Neurological:  Negative for dizziness, tremors, syncope, numbness and headaches.   Hematological:  Positive for adenopathy. Does not bruise/bleed easily.   Psychiatric/Behavioral:  Negative for confusion, sleep disturbance and suicidal ideas.        Past Medical History:   Diagnosis Date    Bleb, lung (CMS/[facility]) 06/28/2023    Encounter for examination of eyes and vision without abnormal findings     Diabetic eye exam    Encounter for screening for malignant neoplasm of prostate 06/30/2021    Screening PSA (prostate specific antigen)    Rib fracture 06/28/2023       Past Surgical History:   Procedure Laterality Date    OTHER SURGICAL HISTORY  10/12/2020    [place] tooth extraction    OTHER SURGICAL HISTORY  10/12/2020    Lung lobectomy    OTHER SURGICAL HISTORY  10/12/2020     Testicular torsion repair    OTHER SURGICAL HISTORY  07/28/2021    Colonoscopy       Family History   Problem Relation Name Age of Onset    Hypertension Mother      Diabetes Mother      Thyroid disease Mother      Hypertension Father      Diabetes Father         Social History     Tobacco Use    Smoking status: Former     Types: Cigarettes    Smokeless tobacco: Never   Vaping Use    Vaping Use: Never used   Substance Use Topics    Alcohol use: Yes     Comment: SOCIALLY    Drug use: Never       Allergies   Allergen Reactions    Itraconazole Hives       Current Outpatient Medications   Medication Sig Dispense Refill    clotrimazole-betamethasone (Lotrisone) cream Apply 1 Application topically 2 times a day as needed (rash). 45 g 1    furosemide (Lasix) 20 mg tablet Take 1 tablet (20 mg) by mouth 2 times a day.      hydroCHLOROthiazide (HYDRODiuril) 50 mg tablet Take 1 tablet (50 mg) by mouth once daily.      latanoprost (Xalatan) 0.005 % ophthalmic solution Administer into affected eye(s).      levothyroxine (Synthroid, Levoxyl) 200 mcg tablet Take 1 tablet (200 mcg) by mouth once daily. 30 tablet 11    levothyroxine (Synthroid, Levoxyl) 25 mcg tablet Take 1 tablet (25 mcg) by mouth once daily.      losartan (Cozaar) 50 mg tablet Take 1 tablet (50 mg) by mouth once daily.      MAGNESIUM CARBONATE ORAL Take by mouth.      multivitamin tablet Take 1 tablet by mouth once daily.      mupirocin (Bactroban) 2 % cream APPLY TO THE AFFECTED AREA(S) THREE TIMES DAILY      potassium chloride CR (K-Tab) 20 mEq ER tablet Take 1 tablet (20 mEq) by mouth once daily.      cyanocobalamin (Vitamin B-12) 1,000 mcg tablet Take by mouth once daily.      dorzolamide (Trusopt) 2 % ophthalmic solution Administer into affected eye(s) twice a day.      fluticasone (Flonase) 50 mcg/actuation nasal spray Administer 1 spray into each nostril once daily as needed for rhinitis or allergies. Shake gently. Before first use, prime pump. After use,  "clean tip and replace cap. (Patient not taking: Reported on 9/7/2023) 16 g 5     No current facility-administered medications for this visit.       Objective   /82   Pulse 82   Ht 1.803 m (5' 11\")   Wt 140 kg (309 lb)   BMI 43.10 kg/m²     Physical Exam  Vitals reviewed.   Constitutional:       Appearance: Normal appearance. He is obese.   HENT:      Head: Normocephalic.      Comments: Sinus tenderness maxillary and frontal bilat      Right Ear: External ear normal.      Left Ear: External ear normal.      Ears:      Comments: Fluid noted bilat ears   Neg erythema, cerumen impaction or pain with pinna movement      Nose: Nose normal. No congestion or rhinorrhea.      Mouth/Throat:      Mouth: Mucous membranes are moist.      Pharynx: No oropharyngeal exudate or posterior oropharyngeal erythema.   Eyes:      Extraocular Movements: Extraocular movements intact.      Conjunctiva/sclera: Conjunctivae normal.      Pupils: Pupils are equal, round, and reactive to light.   Cardiovascular:      Rate and Rhythm: Normal rate and regular rhythm.      Pulses: Normal pulses.   Pulmonary:      Effort: Pulmonary effort is normal.      Breath sounds: Normal breath sounds.   Abdominal:      General: Bowel sounds are normal.      Palpations: Abdomen is soft.      Tenderness: There is no abdominal tenderness. There is no right CVA tenderness or left CVA tenderness.   Musculoskeletal:         General: No tenderness. Normal range of motion.      Cervical back: Normal range of motion and neck supple. No tenderness.      Comments: With cane    Skin:     General: Skin is warm and dry.   Neurological:      General: No focal deficit present.      Mental Status: He is alert and oriented to person, place, and time.   Psychiatric:         Mood and Affect: Mood normal.         Behavior: Behavior normal.     Testing   Set for labs to be done in 1 mo        Impression    MDM    1) COMPLEXITY: MORE THAN 1 STABLE CHRONIC CONDITION " ADDRESSED  2)DATA: TESTS INTERPRETED AND OR ORDERED, TOOK INDEPENDENT HISTORY OR RECORDS REVIEWED  3)RISK: MODERATE RISK DUE TO NATURE OF MEDICAL CONDITIONS/COMORBIDITY OR MEDICATIONS ORDERED OR SURGICAL OR PROCEDURE REFERRAL, .       Reviewed labs and Testing on file   Patient to follow diet low in cholesterol, fat, and sodium.    Patient is advised to increase Exercise.  Patient is recommended to lose weight.  Reviewed Meds and discussed common side effects  Continue as directed   I suspect this is more allergy and sinus related although sinus symptoms are min its presenting more with the ear pressure. Will do course of Abx, already on lasix, advised to start flonase.  Consider decongestant or medrol   Will hold off on steroid given the need for labs in 1 mo   Call if symptoms worsen   Consider ENT referral pending response as discussed - tinnitus is acute so less likely secondary to actual hearing loss   Patient is strongly advised to be compliant with recommendations.    Return to Clinic sooner if needed.  Patient denies further questions/concerns at this time     Assessment/Plan   Problem List Items Addressed This Visit             ICD-10-CM    Glucose intolerance (impaired glucose tolerance) R73.02    Hypertension I10    Class 3 severe obesity due to excess calories with serious comorbidity and body mass index (BMI) of 40.0 to 44.9 in adult (CMS/[facility]) E66.01, Z68.41     Other Visit Diagnoses         Codes    Acute non-recurrent maxillary sinusitis    -  Primary J01.00    Relevant Medications    amoxicillin-pot clavulanate (Augmentin) 875-125 mg tablet    Need for influenza vaccination     Z23    Relevant Orders    Flu vaccine (IIV4) age 6 months and greater, preservative free (Completed)    Ear congestion, bilateral     H93.8X3    Relevant Medications    amoxicillin-pot clavulanate (Augmentin) 875-125 mg tablet    Tinnitus aurium, bilateral     H93.13             FU as before

## 2023-10-23 ENCOUNTER — LAB (OUTPATIENT)
Dept: LAB | Facility: LAB | Age: 63
End: 2023-10-23
Payer: COMMERCIAL

## 2023-10-23 DIAGNOSIS — Z12.5 SCREENING PSA (PROSTATE SPECIFIC ANTIGEN): ICD-10-CM

## 2023-10-23 DIAGNOSIS — E03.9 ACQUIRED HYPOTHYROIDISM: ICD-10-CM

## 2023-10-23 DIAGNOSIS — E78.00 HYPERCHOLESTEROLEMIA: ICD-10-CM

## 2023-10-23 DIAGNOSIS — E53.8 LOW SERUM VITAMIN B12: ICD-10-CM

## 2023-10-23 LAB
ALBUMIN SERPL BCP-MCNC: 4.2 G/DL (ref 3.4–5)
ALP SERPL-CCNC: 68 U/L (ref 33–136)
ALT SERPL W P-5'-P-CCNC: 26 U/L (ref 10–52)
ANION GAP SERPL CALC-SCNC: 13 MMOL/L (ref 10–20)
AST SERPL W P-5'-P-CCNC: 22 U/L (ref 9–39)
BASOPHILS # BLD AUTO: 0.05 X10*3/UL (ref 0–0.1)
BASOPHILS NFR BLD AUTO: 1 %
BILIRUB SERPL-MCNC: 0.8 MG/DL (ref 0–1.2)
BUN SERPL-MCNC: 18 MG/DL (ref 6–23)
CALCIUM SERPL-MCNC: 9.1 MG/DL (ref 8.6–10.3)
CHLORIDE SERPL-SCNC: 103 MMOL/L (ref 98–107)
CHOLEST SERPL-MCNC: 221 MG/DL (ref 0–199)
CHOLESTEROL/HDL RATIO: 4.4
CO2 SERPL-SCNC: 24 MMOL/L (ref 21–32)
CREAT SERPL-MCNC: 0.97 MG/DL (ref 0.5–1.3)
EOSINOPHIL # BLD AUTO: 0.3 X10*3/UL (ref 0–0.7)
EOSINOPHIL NFR BLD AUTO: 5.7 %
ERYTHROCYTE [DISTWIDTH] IN BLOOD BY AUTOMATED COUNT: 12.1 % (ref 11.5–14.5)
GFR SERPL CREATININE-BSD FRML MDRD: 87 ML/MIN/1.73M*2
GLUCOSE SERPL-MCNC: 101 MG/DL (ref 74–99)
HCT VFR BLD AUTO: 46.1 % (ref 41–52)
HDLC SERPL-MCNC: 50 MG/DL
HGB BLD-MCNC: 15.6 G/DL (ref 13.5–17.5)
IMM GRANULOCYTES # BLD AUTO: 0.01 X10*3/UL (ref 0–0.7)
IMM GRANULOCYTES NFR BLD AUTO: 0.2 % (ref 0–0.9)
LDLC SERPL CALC-MCNC: 128 MG/DL
LYMPHOCYTES # BLD AUTO: 1.45 X10*3/UL (ref 1.2–4.8)
LYMPHOCYTES NFR BLD AUTO: 27.7 %
MAGNESIUM SERPL-MCNC: 2.02 MG/DL (ref 1.6–2.4)
MCH RBC QN AUTO: 32.8 PG (ref 26–34)
MCHC RBC AUTO-ENTMCNC: 33.8 G/DL (ref 32–36)
MCV RBC AUTO: 97 FL (ref 80–100)
MONOCYTES # BLD AUTO: 0.56 X10*3/UL (ref 0.1–1)
MONOCYTES NFR BLD AUTO: 10.7 %
NEUTROPHILS # BLD AUTO: 2.87 X10*3/UL (ref 1.2–7.7)
NEUTROPHILS NFR BLD AUTO: 54.7 %
NON HDL CHOLESTEROL: 171 MG/DL (ref 0–149)
NRBC BLD-RTO: 0 /100 WBCS (ref 0–0)
PLATELET # BLD AUTO: 214 X10*3/UL (ref 150–450)
PMV BLD AUTO: 10.4 FL (ref 7.5–11.5)
POTASSIUM SERPL-SCNC: 3.4 MMOL/L (ref 3.5–5.3)
PROT SERPL-MCNC: 7.1 G/DL (ref 6.4–8.2)
PSA SERPL-MCNC: 1.02 NG/ML
RBC # BLD AUTO: 4.76 X10*6/UL (ref 4.5–5.9)
SODIUM SERPL-SCNC: 137 MMOL/L (ref 136–145)
T4 FREE SERPL-MCNC: 1.02 NG/DL (ref 0.61–1.12)
TRIGL SERPL-MCNC: 213 MG/DL (ref 0–149)
TSH SERPL-ACNC: 2.11 MIU/L (ref 0.44–3.98)
VIT B12 SERPL-MCNC: 324 PG/ML (ref 211–911)
VLDL: 43 MG/DL (ref 0–40)
WBC # BLD AUTO: 5.2 X10*3/UL (ref 4.4–11.3)

## 2023-10-23 PROCEDURE — 83735 ASSAY OF MAGNESIUM: CPT

## 2023-10-23 PROCEDURE — 36415 COLL VENOUS BLD VENIPUNCTURE: CPT

## 2023-10-23 PROCEDURE — 85025 COMPLETE CBC W/AUTO DIFF WBC: CPT

## 2023-10-23 PROCEDURE — 80061 LIPID PANEL: CPT

## 2023-10-23 PROCEDURE — 80053 COMPREHEN METABOLIC PANEL: CPT

## 2023-10-23 PROCEDURE — 84153 ASSAY OF PSA TOTAL: CPT

## 2023-10-23 PROCEDURE — 84439 ASSAY OF FREE THYROXINE: CPT

## 2023-10-23 PROCEDURE — 82607 VITAMIN B-12: CPT

## 2023-10-23 PROCEDURE — 84443 ASSAY THYROID STIM HORMONE: CPT

## 2023-10-26 ENCOUNTER — APPOINTMENT (OUTPATIENT)
Dept: PRIMARY CARE | Facility: CLINIC | Age: 63
End: 2023-10-26

## 2023-10-31 ENCOUNTER — OFFICE VISIT (OUTPATIENT)
Dept: PRIMARY CARE | Facility: CLINIC | Age: 63
End: 2023-10-31
Payer: COMMERCIAL

## 2023-10-31 VITALS
HEIGHT: 71 IN | BODY MASS INDEX: 43.54 KG/M2 | DIASTOLIC BLOOD PRESSURE: 86 MMHG | HEART RATE: 64 BPM | WEIGHT: 311 LBS | SYSTOLIC BLOOD PRESSURE: 138 MMHG

## 2023-10-31 DIAGNOSIS — E53.8 LOW SERUM VITAMIN B12: Primary | ICD-10-CM

## 2023-10-31 DIAGNOSIS — E78.00 HYPERCHOLESTEROLEMIA: ICD-10-CM

## 2023-10-31 DIAGNOSIS — M91.11 JUVENILE OSTEOCHONDROSIS OF HEAD OF RIGHT FEMUR (HHS-HCC): ICD-10-CM

## 2023-10-31 DIAGNOSIS — R79.89 ELEVATED FERRITIN: ICD-10-CM

## 2023-10-31 DIAGNOSIS — E03.9 ACQUIRED HYPOTHYROIDISM: ICD-10-CM

## 2023-10-31 DIAGNOSIS — I10 PRIMARY HYPERTENSION: ICD-10-CM

## 2023-10-31 DIAGNOSIS — R73.02 GLUCOSE INTOLERANCE (IMPAIRED GLUCOSE TOLERANCE): ICD-10-CM

## 2023-10-31 DIAGNOSIS — E66.01 CLASS 3 SEVERE OBESITY DUE TO EXCESS CALORIES WITH SERIOUS COMORBIDITY AND BODY MASS INDEX (BMI) OF 40.0 TO 44.9 IN ADULT (MULTI): ICD-10-CM

## 2023-10-31 PROCEDURE — 3075F SYST BP GE 130 - 139MM HG: CPT | Performed by: PHYSICIAN ASSISTANT

## 2023-10-31 PROCEDURE — 3079F DIAST BP 80-89 MM HG: CPT | Performed by: PHYSICIAN ASSISTANT

## 2023-10-31 PROCEDURE — 1036F TOBACCO NON-USER: CPT | Performed by: PHYSICIAN ASSISTANT

## 2023-10-31 PROCEDURE — 3008F BODY MASS INDEX DOCD: CPT | Performed by: PHYSICIAN ASSISTANT

## 2023-10-31 PROCEDURE — 99214 OFFICE O/P EST MOD 30 MIN: CPT | Performed by: PHYSICIAN ASSISTANT

## 2023-10-31 RX ORDER — LANOLIN ALCOHOL/MO/W.PET/CERES
1000 CREAM (GRAM) TOPICAL EVERY OTHER DAY
Qty: 45 TABLET | Refills: 3 | Status: SHIPPED | OUTPATIENT
Start: 2023-10-31

## 2023-10-31 ASSESSMENT — ENCOUNTER SYMPTOMS
NECK PAIN: 0
FLANK PAIN: 0
WOUND: 0
DIARRHEA: 0
NAUSEA: 0
BACK PAIN: 0
FEVER: 0
BRUISES/BLEEDS EASILY: 0
TREMORS: 0
PALPITATIONS: 0
SLEEP DISTURBANCE: 0
EYE REDNESS: 0
CONSTIPATION: 0
CHEST TIGHTNESS: 0
SINUS PAIN: 0
RHINORRHEA: 0
FATIGUE: 0
SORE THROAT: 0
VOMITING: 0
FREQUENCY: 0
EYE DISCHARGE: 0
ARTHRALGIAS: 1
CONFUSION: 0
ABDOMINAL PAIN: 0
DIZZINESS: 0
WHEEZING: 0
NUMBNESS: 0
CHILLS: 0
COUGH: 0
SHORTNESS OF BREATH: 0
HEADACHES: 0

## 2023-10-31 NOTE — PROGRESS NOTES
Subjective   Patient ID: Devon Medina is a 63 y.o. male who presents for Follow-up (4 MONTH F/U WITH LABS. )    HPI    to review labs       Med check   hypothyroid -taking meds - will inc to 225 mcg daily  HTN - home readings are typically 110-130s/70-80  mag - cont on supplement   glaucoma - on drop and following with dr colin fu- R - stable- following with ortho/pod on occasion - pt feels it is getting worse - leg /foot cramping and discomfort . He denies following with ortho or pod in sometime and states he is dealing with so much pain he is needing to take ibuprofen 600-800 at bedtime to help him get out of bed the next morning   elevated ferritin - additional labs done and he was referred to kenneth who he is following with at this time.   LE edema /lymphedmea /chronic venous insuff - following with lymphedema clinic in [place] and has moses hose and overall back to good baseline - not on lasix/K at this time - discussed water therapy but didn't want to do in [place] - is worried about the cost     Preventative testing   PSA - oct 2023   colonoscopy - oct 2022 -polyps - repeat in 3 years   Depression Screen - PHQ2 NEG June 2023  Fall - NEG JUNE 2023      flu shot given in office fall 2023      Patient Active Problem List   Diagnosis    Elevated ferritin    Glaucoma    Glucose intolerance (impaired glucose tolerance)    History of colon polyps    Hypercholesterolemia    Hypertension    Hypothyroidism    Irregular heart rate    Left knee pain    Muqs-Tifaa-Snzpgte disease    Low back pain    Low serum vitamin B12    Lower extremity edema    Muscle cramps    Class 3 severe obesity due to excess calories with serious comorbidity and body mass index (BMI) of 40.0 to 44.9 in adult (CMS/HCC)    Rash    Right shoulder pain    Primary open-angle glaucoma, bilateral, mild stage       Review of Systems   Constitutional:  Negative for chills, fatigue and fever.   HENT:  Negative for congestion, rhinorrhea,  sinus pain, sore throat and tinnitus.    Eyes:  Negative for discharge, redness and visual disturbance.   Respiratory:  Negative for cough, chest tightness, shortness of breath and wheezing.    Cardiovascular:  Negative for chest pain, palpitations and leg swelling.   Gastrointestinal:  Negative for abdominal pain, constipation, diarrhea, nausea and vomiting.   Endocrine: Negative for cold intolerance and heat intolerance.   Genitourinary:  Negative for flank pain, frequency and urgency.   Musculoskeletal:  Positive for arthralgias and gait problem. Negative for back pain and neck pain.   Skin:  Negative for rash and wound.   Neurological:  Negative for dizziness, tremors, syncope, numbness and headaches.   Hematological:  Does not bruise/bleed easily.   Psychiatric/Behavioral:  Negative for confusion, sleep disturbance and suicidal ideas.        Past Medical History:   Diagnosis Date    Bleb, lung (CMS/HCC) 06/28/2023    Encounter for examination of eyes and vision without abnormal findings     Diabetic eye exam    Encounter for screening for malignant neoplasm of prostate 06/30/2021    Screening PSA (prostate specific antigen)    Rib fracture 06/28/2023       Past Surgical History:   Procedure Laterality Date    OTHER SURGICAL HISTORY  10/12/2020    [place] tooth extraction    OTHER SURGICAL HISTORY  10/12/2020    Lung lobectomy    OTHER SURGICAL HISTORY  10/12/2020    Testicular torsion repair    OTHER SURGICAL HISTORY  07/28/2021    Colonoscopy       Family History   Problem Relation Name Age of Onset    Hypertension Mother      Diabetes Mother      Thyroid disease Mother      Hypertension Father      Diabetes Father         Social History     Tobacco Use    Smoking status: Former     Types: Cigarettes    Smokeless tobacco: Never   Vaping Use    Vaping Use: Never used   Substance Use Topics    Alcohol use: Yes     Comment: SOCIALLY    Drug use: Never       Allergies   Allergen Reactions    Itraconazole Hives  "      Current Outpatient Medications   Medication Sig Dispense Refill    dorzolamide (Trusopt) 2 % ophthalmic solution Administer into affected eye(s) twice a day.      fluticasone (Flonase) 50 mcg/actuation nasal spray Administer 1 spray into each nostril once daily as needed for rhinitis or allergies. Shake gently. Before first use, prime pump. After use, clean tip and replace cap. 16 g 5    hydroCHLOROthiazide (HYDRODiuril) 50 mg tablet Take 1 tablet (50 mg) by mouth once daily.      latanoprost (Xalatan) 0.005 % ophthalmic solution Administer into affected eye(s).      levothyroxine (Synthroid, Levoxyl) 200 mcg tablet Take 1 tablet (200 mcg) by mouth once daily. 30 tablet 11    levothyroxine (Synthroid, Levoxyl) 25 mcg tablet Take 1 tablet (25 mcg) by mouth once daily.      losartan (Cozaar) 50 mg tablet Take 1 tablet (50 mg) by mouth 2 times a day.      MAGNESIUM CARBONATE ORAL Take by mouth.      multivitamin tablet Take 1 tablet by mouth once daily.      cyanocobalamin (Vitamin B-12) 1,000 mcg tablet Take by mouth once daily.      furosemide (Lasix) 20 mg tablet Take 1 tablet (20 mg) by mouth 2 times a day.      mupirocin (Bactroban) 2 % cream APPLY TO THE AFFECTED AREA(S) THREE TIMES DAILY      potassium chloride CR (K-Tab) 20 mEq ER tablet Take 1 tablet (20 mEq) by mouth once daily.       No current facility-administered medications for this visit.       Objective   /86   Pulse 64   Ht 1.803 m (5' 11\")   Wt 141 kg (311 lb)   BMI 43.38 kg/m²     Physical Exam  Vitals reviewed.   Constitutional:       Appearance: Normal appearance. He is obese.   HENT:      Head: Normocephalic.      Right Ear: External ear normal.      Left Ear: External ear normal.      Nose: Nose normal. No congestion or rhinorrhea.      Mouth/Throat:      Mouth: Mucous membranes are moist.   Eyes:      Extraocular Movements: Extraocular movements intact.      Conjunctiva/sclera: Conjunctivae normal.      Pupils: Pupils are equal, " round, and reactive to light.   Cardiovascular:      Rate and Rhythm: Normal rate and regular rhythm.      Pulses: Normal pulses.   Pulmonary:      Effort: Pulmonary effort is normal.      Breath sounds: Normal breath sounds.   Abdominal:      General: Bowel sounds are normal.      Palpations: Abdomen is soft.      Tenderness: There is no abdominal tenderness. There is no right CVA tenderness or left CVA tenderness.   Musculoskeletal:         General: No tenderness. Normal range of motion.      Cervical back: Normal range of motion and neck supple. No tenderness.      Comments: Walking cane    Skin:     General: Skin is warm and dry.   Neurological:      General: No focal deficit present.      Mental Status: He is alert and oriented to person, place, and time.   Psychiatric:         Mood and Affect: Mood normal.         Behavior: Behavior normal.         Testing   Component      Latest Ref [facility] 10/23/2023   WBC      4.4 - 11.3 x10*3/uL 5.2    nRBC      0.0 - 0.0 /100 WBCs 0.0    RBC      4.50 - 5.90 x10*6/uL 4.76    HEMOGLOBIN      13.5 - 17.5 g/dL 15.6    HEMATOCRIT      41.0 - 52.0 % 46.1    MCV      80 - 100 fL 97    MCH      26.0 - 34.0 pg 32.8    MCHC      32.0 - 36.0 g/dL 33.8    RED CELL DISTRIBUTION WIDTH      11.5 - 14.5 % 12.1    Platelets      150 - 450 x10*3/uL 214    MEAN PLATELET VOLUME      7.5 - 11.5 fL 10.4    Neutrophils %      40.0 - 80.0 % 54.7    Immature Granulocytes %, Automated      0.0 - 0.9 % 0.2    Lymphocytes %      13.0 - 44.0 % 27.7    Monocytes %      2.0 - 10.0 % 10.7    Eosinophils %      0.0 - 6.0 % 5.7    Basophils %      0.0 - 2.0 % 1.0    Neutrophils Absolute      1.20 - 7.70 x10*3/uL 2.87    Immature Granulocytes Absolute, Automated      0.00 - 0.70 x10*3/uL 0.01    Lymphocytes Absolute      1.20 - 4.80 x10*3/uL 1.45    Monocytes Absolute      0.10 - 1.00 x10*3/uL 0.56    Eosinophils Absolute      0.00 - 0.70 x10*3/uL 0.30    Basophils Absolute      0.00 - 0.10 x10*3/uL 0.05     GLUCOSE      74 - 99 mg/dL 101 (H)    SODIUM      136 - 145 mmol/L 137    POTASSIUM      3.5 - 5.3 mmol/L 3.4 (L)    CHLORIDE      98 - 107 mmol/L 103    Bicarbonate      21 - 32 mmol/L 24    Anion Gap      10 - 20 mmol/L 13    Blood Urea Nitrogen      6 - 23 mg/dL 18    Creatinine      0.50 - 1.30 mg/dL 0.97    EGFR      >60 mL/min/1.73m*2 87    Calcium      8.6 - 10.3 mg/dL 9.1    Albumin      3.4 - 5.0 g/dL 4.2    Alkaline Phosphatase      33 - 136 U/L 68    Total Protein      6.4 - 8.2 g/dL 7.1    AST      9 - 39 U/L 22    Bilirubin Total      0.0 - 1.2 mg/dL 0.8    ALT      10 - 52 U/L 26    CHOLESTEROL      0 - 199 mg/dL 221 (H)    HDL CHOLESTEROL      mg/dL 50.0    Cholesterol/HDL Ratio 4.4    LDL Calculated      <=99 mg/dL 128 (H)    VLDL      0 - 40 mg/dL 43 (H)    TRIGLYCERIDES      0 - 149 mg/dL 213 (H)    Non HDL Cholesterol      0 - 149 mg/dL 171 (H)    Thyroid Stimulating Hormone      0.44 - 3.98 mIU/L 2.11    Thyroxine, Free      0.61 - 1.12 ng/dL 1.02    Prostate Specific Antigen,Screen      <=4.00 ng/mL 1.02    MAGNESIUM      1.60 - 2.40 mg/dL 2.02    Vitamin B12      211 - 911 pg/mL 324             Impression    MDM    1) COMPLEXITY: MORE THAN 1 STABLE CHRONIC CONDITION ADDRESSED  2)DATA: TESTS INTERPRETED AND OR ORDERED, TOOK INDEPENDENT HISTORY OR RECORDS REVIEWED  3)RISK: MODERATE RISK DUE TO NATURE OF MEDICAL CONDITIONS/COMORBIDITY OR MEDICATIONS ORDERED OR SURGICAL OR PROCEDURE REFERRAL, .       Reviewed labs and Testing on file   Patient to follow diet low in cholesterol, fat, and sodium.    Patient is advised to increase Exercise.  Patient is recommended to lose weight.  Reviewed Meds and discussed common side effects  Continue as directed   Started B12 every other day   Mild hypokalemia - denies symptoms and denies wanting to change meds at this time but would like to work on inc in diet   Patient is strongly advised to be compliant with recommendations.    Return to Clinic sooner if  needed.  Patient denies further questions/concerns at this time     Assessment/Plan   Problem List Items Addressed This Visit             ICD-10-CM    Elevated ferritin R79.89    Relevant Orders    Iron and TIBC    Ferritin    Glucose intolerance (impaired glucose tolerance) R73.02    Relevant Orders    CBC and Auto Differential    Comprehensive Metabolic Panel    Hemoglobin A1C    Lipid Panel    Thyroid Stimulating Hormone    Thyroxine, Free    Magnesium    Vitamin B12    Hypercholesterolemia E78.00    Relevant Orders    CBC and Auto Differential    Comprehensive Metabolic Panel    Hemoglobin A1C    Lipid Panel    Thyroid Stimulating Hormone    Thyroxine, Free    Magnesium    Vitamin B12    Hypertension I10    Hypothyroidism E03.9    Relevant Orders    Thyroid Stimulating Hormone    Thyroxine, Free    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

## 2023-11-22 NOTE — PROGRESS NOTES
Patient ID: Rosalba Ba is a 63 y.o. male.    Subjective    HPI    Patient Visit Information:   Visit Type: Follow Up Visit      History of Present Illness:      ID Statement:    ROSALBA BA is a 63 year old Male        Chief Complaint: Evaluation for elevated ferritin   Interval History:    Referred by Alpa Saini PA-C      Reason for referral: elevated ferritin      HPI   62 year old man with hx of hypothyroidism, obesity, HTN, HL, leg-Calve-Perthes disease  (idiopathic AVN of hip) who presents for evaluation of elevated ferritin. His labs showed elevated ferritin 503-554 since Oct 2021, Tsat 31-43%.  He is feeling ok in general   he had no recent infections or fever   walks usually with his cane, limited mobility secondary to his hip problems   he does not have a lot of pain though   eats well, no nausea or vomiting   no bleeding, no hematuria, hematochezia or melena    energy is fair   he is not very active though      interval history- 12/1/23    Fell in October, missed last step coming out of his house.   No major injury, slight bruising to right hip      Feels well today   Energy is decent  Remains walking with a cane  Walking 6 days weekly on a treadmill at home   Chronic lower back and right hip pain, manages jairo ibuprofen   No numbness tingling hands or feet      bilateral leg edema is improving   continues to wear compression stocking for edema      Appetite is good, eating well trying to healthier  drinking water and lots of coffee  no new falls, no new hospital admissions  No leg cramps   No GI symptoms   No urinary issues   No hematuria, hematochezia or melena   no fevers, chills or infections  No SOB  no chest pain or pressure   No abdominal pain or cramping   Consumes alcohol occasional twice a month 4-5 beers      Objective    BSA: There is no height or weight on file to calculate BSA.  There were no vitals taken for this visit.     Physical Exam  Vitals and nursing note reviewed.    Constitutional:       General: He is not in acute distress.     Appearance: Normal appearance.   HENT:      Head: Normocephalic and atraumatic.      Mouth/Throat:      Mouth: Mucous membranes are moist.      Pharynx: Oropharynx is clear.   Eyes:      General: No scleral icterus.     Extraocular Movements: Extraocular movements intact.      Conjunctiva/sclera: Conjunctivae normal.   Cardiovascular:      Rate and Rhythm: Normal rate and regular rhythm.      Pulses: Normal pulses.      Heart sounds: Normal heart sounds. No murmur heard.  Pulmonary:      Effort: Pulmonary effort is normal. No respiratory distress.      Breath sounds: Normal breath sounds.   Abdominal:      General: There is no distension.      Palpations: Abdomen is soft.      Tenderness: There is no abdominal tenderness.   Musculoskeletal:         General: No swelling, tenderness or deformity. Normal range of motion.      Cervical back: Normal range of motion and neck supple.   Skin:     General: Skin is warm and dry.   Neurological:      General: No focal deficit present.      Mental Status: He is alert and oriented to person, place, and time.      Motor: No weakness.   Psychiatric:         Mood and Affect: Mood normal.         Behavior: Behavior normal.         Thought Content: Thought content normal.         Judgment: Judgment normal.       Performance Status:  Asymptomatic      Assessment/Plan      Assessment:    1. 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 Chronic lower back pain, and right hip pain, continue to manage with OTC ibuprofen. Reports no alcohol consumption recently. Emphasized the importance of abstaining from consuming alcohol. Remains to eat healthy and increase activity to lose weight.     His Ferritin remains elevated but stable at 444. Discussed that we continue to hold of on proceeding with phlebotomies at this time. Will recheck labs in 4-5 months and consider phlebotomy if Ferritin remains >500.          RTC 4-5 m with labs (CBC, CMP Iron Studies, Ferritin, and B12)      Alpa James, APRN-CNP

## 2023-11-28 ENCOUNTER — LAB (OUTPATIENT)
Dept: LAB | Facility: LAB | Age: 63
End: 2023-11-28
Payer: COMMERCIAL

## 2023-11-28 DIAGNOSIS — R79.89 OTHER SPECIFIED ABNORMAL FINDINGS OF BLOOD CHEMISTRY: Primary | ICD-10-CM

## 2023-11-28 LAB
ALBUMIN SERPL BCP-MCNC: 4.3 G/DL (ref 3.4–5)
ALP SERPL-CCNC: 66 U/L (ref 33–136)
ALT SERPL W P-5'-P-CCNC: 28 U/L (ref 10–52)
ANION GAP SERPL CALC-SCNC: 14 MMOL/L (ref 10–20)
AST SERPL W P-5'-P-CCNC: 24 U/L (ref 9–39)
BASOPHILS # BLD AUTO: 0.05 X10*3/UL (ref 0–0.1)
BASOPHILS NFR BLD AUTO: 1.1 %
BILIRUB SERPL-MCNC: 0.9 MG/DL (ref 0–1.2)
BUN SERPL-MCNC: 30 MG/DL (ref 6–23)
CALCIUM SERPL-MCNC: 9.2 MG/DL (ref 8.6–10.3)
CHLORIDE SERPL-SCNC: 103 MMOL/L (ref 98–107)
CO2 SERPL-SCNC: 25 MMOL/L (ref 21–32)
CREAT SERPL-MCNC: 1.17 MG/DL (ref 0.5–1.3)
EOSINOPHIL # BLD AUTO: 0.18 X10*3/UL (ref 0–0.7)
EOSINOPHIL NFR BLD AUTO: 3.9 %
ERYTHROCYTE [DISTWIDTH] IN BLOOD BY AUTOMATED COUNT: 12.1 % (ref 11.5–14.5)
FERRITIN SERPL-MCNC: 444 NG/ML (ref 20–300)
GFR SERPL CREATININE-BSD FRML MDRD: 70 ML/MIN/1.73M*2
GLUCOSE SERPL-MCNC: 108 MG/DL (ref 74–99)
HCT VFR BLD AUTO: 47.2 % (ref 41–52)
HGB BLD-MCNC: 15.7 G/DL (ref 13.5–17.5)
IMM GRANULOCYTES # BLD AUTO: 0.01 X10*3/UL (ref 0–0.7)
IMM GRANULOCYTES NFR BLD AUTO: 0.2 % (ref 0–0.9)
IRON SATN MFR SERPL: 47 % (ref 25–45)
IRON SERPL-MCNC: 155 UG/DL (ref 35–150)
LYMPHOCYTES # BLD AUTO: 1.26 X10*3/UL (ref 1.2–4.8)
LYMPHOCYTES NFR BLD AUTO: 27.5 %
MCH RBC QN AUTO: 31.8 PG (ref 26–34)
MCHC RBC AUTO-ENTMCNC: 33.3 G/DL (ref 32–36)
MCV RBC AUTO: 96 FL (ref 80–100)
MONOCYTES # BLD AUTO: 0.48 X10*3/UL (ref 0.1–1)
MONOCYTES NFR BLD AUTO: 10.5 %
NEUTROPHILS # BLD AUTO: 2.61 X10*3/UL (ref 1.2–7.7)
NEUTROPHILS NFR BLD AUTO: 56.8 %
NRBC BLD-RTO: 0 /100 WBCS (ref 0–0)
PLATELET # BLD AUTO: 212 X10*3/UL (ref 150–450)
POTASSIUM SERPL-SCNC: 3.6 MMOL/L (ref 3.5–5.3)
PROT SERPL-MCNC: 7.2 G/DL (ref 6.4–8.2)
RBC # BLD AUTO: 4.93 X10*6/UL (ref 4.5–5.9)
SODIUM SERPL-SCNC: 138 MMOL/L (ref 136–145)
TIBC SERPL-MCNC: 327 UG/DL (ref 240–445)
UIBC SERPL-MCNC: 172 UG/DL (ref 110–370)
VIT B12 SERPL-MCNC: 497 PG/ML (ref 211–911)
WBC # BLD AUTO: 4.6 X10*3/UL (ref 4.4–11.3)

## 2023-11-28 PROCEDURE — 82607 VITAMIN B-12: CPT

## 2023-11-28 PROCEDURE — 36415 COLL VENOUS BLD VENIPUNCTURE: CPT

## 2023-11-28 PROCEDURE — 80053 COMPREHEN METABOLIC PANEL: CPT

## 2023-11-28 PROCEDURE — 82728 ASSAY OF FERRITIN: CPT

## 2023-11-28 PROCEDURE — 83550 IRON BINDING TEST: CPT

## 2023-11-28 PROCEDURE — 85025 COMPLETE CBC W/AUTO DIFF WBC: CPT

## 2023-11-28 PROCEDURE — 83540 ASSAY OF IRON: CPT

## 2023-12-01 ENCOUNTER — OFFICE VISIT (OUTPATIENT)
Dept: HEMATOLOGY/ONCOLOGY | Facility: CLINIC | Age: 63
End: 2023-12-01
Payer: COMMERCIAL

## 2023-12-01 VITALS
OXYGEN SATURATION: 98 % | SYSTOLIC BLOOD PRESSURE: 149 MMHG | RESPIRATION RATE: 20 BRPM | HEIGHT: 71 IN | WEIGHT: 310.6 LBS | DIASTOLIC BLOOD PRESSURE: 92 MMHG | HEART RATE: 80 BPM | TEMPERATURE: 97.2 F | BODY MASS INDEX: 43.48 KG/M2

## 2023-12-01 DIAGNOSIS — E53.8 LOW SERUM VITAMIN B12: ICD-10-CM

## 2023-12-01 DIAGNOSIS — R79.89 ELEVATED FERRITIN: Primary | ICD-10-CM

## 2023-12-01 PROCEDURE — 3080F DIAST BP >= 90 MM HG: CPT

## 2023-12-01 PROCEDURE — 99213 OFFICE O/P EST LOW 20 MIN: CPT

## 2023-12-01 PROCEDURE — 3008F BODY MASS INDEX DOCD: CPT

## 2023-12-01 PROCEDURE — 3077F SYST BP >= 140 MM HG: CPT

## 2023-12-01 PROCEDURE — 1036F TOBACCO NON-USER: CPT

## 2023-12-01 ASSESSMENT — PAIN SCALES - GENERAL: PAINLEVEL: 0-NO PAIN

## 2023-12-01 NOTE — PROGRESS NOTES
Registrant scheduled pt for follow up with CNP  Instructed to get labs prior to appt at hosp.  Reviewed AVS with patient- patient verbalizes understanding

## 2024-02-26 ENCOUNTER — TELEPHONE (OUTPATIENT)
Dept: PRIMARY CARE | Facility: CLINIC | Age: 64
End: 2024-02-26
Payer: COMMERCIAL

## 2024-02-26 DIAGNOSIS — H93.13 TINNITUS OF BOTH EARS: Primary | ICD-10-CM

## 2024-02-27 NOTE — TELEPHONE ENCOUNTER
Ok for referral.  This was discussed at his sept visit   I am curious did the ringing stop and then since return or has this been chronic since his sept visit?  If its stopped and returned then its likely allergy and weather related.  We had previously discussed trial of steroid but I believe put it on hold given he was set to do labs in the near future  If he has had this consistently then I agree the referral is the next step   Ok to refer either way but I would like the updated documentation   Thanks

## 2024-03-08 DIAGNOSIS — I10 ESSENTIAL (PRIMARY) HYPERTENSION: ICD-10-CM

## 2024-03-08 DIAGNOSIS — E03.9 HYPOTHYROIDISM, UNSPECIFIED: ICD-10-CM

## 2024-03-11 RX ORDER — LEVOTHYROXINE SODIUM 25 UG/1
25 TABLET ORAL DAILY
Qty: 90 TABLET | Refills: 3 | Status: SHIPPED | OUTPATIENT
Start: 2024-03-11

## 2024-03-11 RX ORDER — HYDROCHLOROTHIAZIDE 50 MG/1
50 TABLET ORAL DAILY
Qty: 90 TABLET | Refills: 3 | Status: SHIPPED | OUTPATIENT
Start: 2024-03-11

## 2024-03-25 ENCOUNTER — LAB (OUTPATIENT)
Dept: LAB | Facility: LAB | Age: 64
End: 2024-03-25
Payer: MEDICAID

## 2024-03-25 DIAGNOSIS — R79.89 ELEVATED FERRITIN: ICD-10-CM

## 2024-03-25 DIAGNOSIS — E53.8 LOW SERUM VITAMIN B12: ICD-10-CM

## 2024-03-25 LAB
ALBUMIN SERPL BCP-MCNC: 4.4 G/DL (ref 3.4–5)
ALP SERPL-CCNC: 69 U/L (ref 33–136)
ALT SERPL W P-5'-P-CCNC: 23 U/L (ref 10–52)
ANION GAP SERPL CALC-SCNC: 15 MMOL/L (ref 10–20)
AST SERPL W P-5'-P-CCNC: 25 U/L (ref 9–39)
BASOPHILS # BLD AUTO: 0.05 X10*3/UL (ref 0–0.1)
BASOPHILS NFR BLD AUTO: 1.1 %
BILIRUB SERPL-MCNC: 0.8 MG/DL (ref 0–1.2)
BUN SERPL-MCNC: 21 MG/DL (ref 6–23)
CALCIUM SERPL-MCNC: 9.3 MG/DL (ref 8.6–10.3)
CHLORIDE SERPL-SCNC: 102 MMOL/L (ref 98–107)
CO2 SERPL-SCNC: 23 MMOL/L (ref 21–32)
CREAT SERPL-MCNC: 1.26 MG/DL (ref 0.5–1.3)
EGFRCR SERPLBLD CKD-EPI 2021: 63 ML/MIN/1.73M*2
EOSINOPHIL # BLD AUTO: 0.21 X10*3/UL (ref 0–0.7)
EOSINOPHIL NFR BLD AUTO: 4.7 %
ERYTHROCYTE [DISTWIDTH] IN BLOOD BY AUTOMATED COUNT: 12.2 % (ref 11.5–14.5)
FERRITIN SERPL-MCNC: 581 NG/ML (ref 20–300)
GLUCOSE SERPL-MCNC: 92 MG/DL (ref 74–99)
HCT VFR BLD AUTO: 45.4 % (ref 41–52)
HGB BLD-MCNC: 15.6 G/DL (ref 13.5–17.5)
IMM GRANULOCYTES # BLD AUTO: 0.01 X10*3/UL (ref 0–0.7)
IMM GRANULOCYTES NFR BLD AUTO: 0.2 % (ref 0–0.9)
IRON SATN MFR SERPL: 44 % (ref 25–45)
IRON SERPL-MCNC: 137 UG/DL (ref 35–150)
LYMPHOCYTES # BLD AUTO: 1.36 X10*3/UL (ref 1.2–4.8)
LYMPHOCYTES NFR BLD AUTO: 30.4 %
MCH RBC QN AUTO: 32.2 PG (ref 26–34)
MCHC RBC AUTO-ENTMCNC: 34.4 G/DL (ref 32–36)
MCV RBC AUTO: 94 FL (ref 80–100)
MONOCYTES # BLD AUTO: 0.51 X10*3/UL (ref 0.1–1)
MONOCYTES NFR BLD AUTO: 11.4 %
NEUTROPHILS # BLD AUTO: 2.33 X10*3/UL (ref 1.2–7.7)
NEUTROPHILS NFR BLD AUTO: 52.2 %
NRBC BLD-RTO: 0 /100 WBCS (ref 0–0)
PLATELET # BLD AUTO: 230 X10*3/UL (ref 150–450)
POTASSIUM SERPL-SCNC: 3.4 MMOL/L (ref 3.5–5.3)
PROT SERPL-MCNC: 7.1 G/DL (ref 6.4–8.2)
RBC # BLD AUTO: 4.84 X10*6/UL (ref 4.5–5.9)
SODIUM SERPL-SCNC: 137 MMOL/L (ref 136–145)
TIBC SERPL-MCNC: 313 UG/DL (ref 240–445)
UIBC SERPL-MCNC: 176 UG/DL (ref 110–370)
VIT B12 SERPL-MCNC: 542 PG/ML (ref 211–911)
WBC # BLD AUTO: 4.5 X10*3/UL (ref 4.4–11.3)

## 2024-03-25 PROCEDURE — 85025 COMPLETE CBC W/AUTO DIFF WBC: CPT

## 2024-03-25 PROCEDURE — 82728 ASSAY OF FERRITIN: CPT

## 2024-03-25 PROCEDURE — 82607 VITAMIN B-12: CPT

## 2024-03-25 PROCEDURE — 83540 ASSAY OF IRON: CPT

## 2024-03-25 PROCEDURE — 83550 IRON BINDING TEST: CPT

## 2024-03-25 PROCEDURE — 36415 COLL VENOUS BLD VENIPUNCTURE: CPT

## 2024-03-25 PROCEDURE — 80053 COMPREHEN METABOLIC PANEL: CPT

## 2024-04-01 ENCOUNTER — OFFICE VISIT (OUTPATIENT)
Dept: HEMATOLOGY/ONCOLOGY | Facility: CLINIC | Age: 64
End: 2024-04-01
Payer: MEDICAID

## 2024-04-01 VITALS
WEIGHT: 279 LBS | OXYGEN SATURATION: 99 % | SYSTOLIC BLOOD PRESSURE: 131 MMHG | RESPIRATION RATE: 18 BRPM | DIASTOLIC BLOOD PRESSURE: 85 MMHG | BODY MASS INDEX: 39.06 KG/M2 | HEIGHT: 71 IN | TEMPERATURE: 97.5 F | HEART RATE: 76 BPM

## 2024-04-01 DIAGNOSIS — R79.89 ELEVATED FERRITIN: ICD-10-CM

## 2024-04-01 DIAGNOSIS — E53.8 LOW SERUM VITAMIN B12: ICD-10-CM

## 2024-04-01 PROCEDURE — 3008F BODY MASS INDEX DOCD: CPT

## 2024-04-01 PROCEDURE — 3079F DIAST BP 80-89 MM HG: CPT

## 2024-04-01 PROCEDURE — 99213 OFFICE O/P EST LOW 20 MIN: CPT

## 2024-04-01 PROCEDURE — 1159F MED LIST DOCD IN RCRD: CPT

## 2024-04-01 PROCEDURE — 3075F SYST BP GE 130 - 139MM HG: CPT

## 2024-04-01 PROCEDURE — 1126F AMNT PAIN NOTED NONE PRSNT: CPT

## 2024-04-01 RX ORDER — POTASSIUM CHLORIDE 20 MEQ/1
20 TABLET, EXTENDED RELEASE ORAL DAILY
Qty: 10 TABLET | Refills: 0 | Status: SHIPPED | OUTPATIENT
Start: 2024-04-01 | End: 2024-04-11

## 2024-04-01 ASSESSMENT — COLUMBIA-SUICIDE SEVERITY RATING SCALE - C-SSRS
1. IN THE PAST MONTH, HAVE YOU WISHED YOU WERE DEAD OR WISHED YOU COULD GO TO SLEEP AND NOT WAKE UP?: NO
6. HAVE YOU EVER DONE ANYTHING, STARTED TO DO ANYTHING, OR PREPARED TO DO ANYTHING TO END YOUR LIFE?: NO
2. HAVE YOU ACTUALLY HAD ANY THOUGHTS OF KILLING YOURSELF?: NO

## 2024-04-01 ASSESSMENT — PATIENT HEALTH QUESTIONNAIRE - PHQ9
SUM OF ALL RESPONSES TO PHQ9 QUESTIONS 1 AND 2: 0
2. FEELING DOWN, DEPRESSED OR HOPELESS: NOT AT ALL
1. LITTLE INTEREST OR PLEASURE IN DOING THINGS: NOT AT ALL

## 2024-04-01 ASSESSMENT — PAIN SCALES - GENERAL: PAINLEVEL: 0-NO PAIN

## 2024-04-01 NOTE — PROGRESS NOTES
Pt will return to clinic for visit with Alpa 8/30 with labs prior at the hospital.  Pt given AVS and voices understanding.

## 2024-04-01 NOTE — PROGRESS NOTES
Patient ID: Rosalba Ba is a 64 y.o. male.    Subjective    HPI    Patient Visit Information:   Visit Type: Follow Up Visit      History of Present Illness:      ID Statement:    ROSALBA BA is a 63 year old Male        Chief Complaint: Evaluation for elevated ferritin   Interval History:    Referred by Alpa Saini PA-C      Reason for referral: elevated ferritin      HPI   62 year old man with hx of hypothyroidism, obesity, HTN, HL, leg-Calve-Perthes disease  (idiopathic AVN of hip) who presents for evaluation of elevated ferritin. His labs showed elevated ferritin 503-554 since Oct 2021, Tsat 31-43%.  He is feeling ok in general   he had no recent infections or fever   walks usually with his cane, limited mobility secondary to his hip problems   he does not have a lot of pain though   eats well, no nausea or vomiting   no bleeding, no hematuria, hematochezia or melena    energy is fair   he is not very active though      interval history- 4/1/24     Feels well today   Energy is decent  Remains walking with a cane  Walking 7 days weekly on a treadmill at home   Chronic lower back and right hip pain, manages with ibuprofen   No numbness tingling hands or feet      continues to wear compression stocking for edema      Appetite is good, eating well trying to healthier  drinking water and lots of coffee  no new falls, no new hospital admissions  No leg cramps   No GI symptoms   No urinary issues   No hematuria, hematochezia or melena   no fevers, chills or infections  No SOB  no chest pain or pressure   No abdominal pain or cramping   No alcohol use since last visit        Objective    BSA: There is no height or weight on file to calculate BSA.  There were no vitals taken for this visit.     Physical Exam  Vitals and nursing note reviewed.   Constitutional:       Appearance: Normal appearance.   HENT:      Head: Normocephalic and atraumatic.      Mouth/Throat:      Pharynx: Oropharynx is clear.   Eyes:       General: No scleral icterus.     Extraocular Movements: Extraocular movements intact.      Conjunctiva/sclera: Conjunctivae normal.   Cardiovascular:      Rate and Rhythm: Normal rate and regular rhythm.      Pulses: Normal pulses.      Heart sounds: Normal heart sounds.   Pulmonary:      Effort: Pulmonary effort is normal.      Breath sounds: Normal breath sounds.   Abdominal:      General: There is no distension.      Palpations: Abdomen is soft.      Tenderness: There is no abdominal tenderness.   Musculoskeletal:         General: Swelling present. Normal range of motion.      Cervical back: Normal range of motion.   Skin:     General: Skin is warm and dry.   Neurological:      General: No focal deficit present.      Mental Status: He is alert and oriented to person, place, and time.      Motor: Weakness present.   Psychiatric:         Mood and Affect: Mood normal.         Behavior: Behavior normal.         Thought Content: Thought content normal.         Judgment: Judgment normal.         Performance Status:  Asymptomatic      Assessment/Plan      Assessment:    1. 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 Patient has lost 30lbs since visit. Continues to eat healthy and walk daily on his treadmill. Remains to have chronic lower back pain, and right hip pain, manages with OTC ibuprofen, as needed. Reports no alcohol consumption recently.  Emphasized the importance of abstaining from consuming alcohol.      His Ferritin remains elevated but stable at 581. Discussed that we continue to hold of on proceeding with phlebotomies at this time. Will recheck labs in 4-5 months and consider phlebotomy if Ferritin remains >500.          RTC 4-5 m with labs (CBC, CMP Iron Studies, Ferritin, and B12)       Alpa James, APRN-CNP

## 2024-04-22 DIAGNOSIS — E03.9 HYPOTHYROIDISM, UNSPECIFIED: ICD-10-CM

## 2024-04-29 RX ORDER — LEVOTHYROXINE SODIUM 25 UG/1
25 TABLET ORAL DAILY
Qty: 90 TABLET | Refills: 3 | OUTPATIENT
Start: 2024-04-29

## 2024-05-15 ENCOUNTER — APPOINTMENT (OUTPATIENT)
Dept: PRIMARY CARE | Facility: CLINIC | Age: 64
End: 2024-05-15
Payer: COMMERCIAL

## 2024-05-22 DIAGNOSIS — I10 PRIMARY HYPERTENSION: Primary | ICD-10-CM

## 2024-05-22 RX ORDER — LOSARTAN POTASSIUM 50 MG/1
50 TABLET ORAL 2 TIMES DAILY
Qty: 30 TABLET | Refills: 1 | Status: SHIPPED | OUTPATIENT
Start: 2024-05-22

## 2024-06-01 ENCOUNTER — LAB (OUTPATIENT)
Dept: LAB | Facility: LAB | Age: 64
End: 2024-06-01
Payer: MEDICARE

## 2024-06-01 DIAGNOSIS — R79.89 ELEVATED FERRITIN: ICD-10-CM

## 2024-06-01 DIAGNOSIS — R73.02 GLUCOSE INTOLERANCE (IMPAIRED GLUCOSE TOLERANCE): ICD-10-CM

## 2024-06-01 DIAGNOSIS — E53.8 LOW SERUM VITAMIN B12: ICD-10-CM

## 2024-06-01 DIAGNOSIS — E03.9 ACQUIRED HYPOTHYROIDISM: ICD-10-CM

## 2024-06-01 DIAGNOSIS — E78.00 HYPERCHOLESTEROLEMIA: ICD-10-CM

## 2024-06-01 LAB
ALBUMIN SERPL BCP-MCNC: 4.2 G/DL (ref 3.4–5)
ALP SERPL-CCNC: 72 U/L (ref 33–136)
ALT SERPL W P-5'-P-CCNC: 25 U/L (ref 10–52)
ANION GAP SERPL CALC-SCNC: 10 MMOL/L (ref 10–20)
AST SERPL W P-5'-P-CCNC: 26 U/L (ref 9–39)
BASOPHILS # BLD AUTO: 0.06 X10*3/UL (ref 0–0.1)
BASOPHILS NFR BLD AUTO: 1.3 %
BILIRUB SERPL-MCNC: 0.7 MG/DL (ref 0–1.2)
BUN SERPL-MCNC: 22 MG/DL (ref 6–23)
CALCIUM SERPL-MCNC: 9 MG/DL (ref 8.6–10.3)
CHLORIDE SERPL-SCNC: 105 MMOL/L (ref 98–107)
CHOLEST SERPL-MCNC: 223 MG/DL (ref 0–199)
CHOLESTEROL/HDL RATIO: 3.7
CO2 SERPL-SCNC: 26 MMOL/L (ref 21–32)
CREAT SERPL-MCNC: 0.92 MG/DL (ref 0.5–1.3)
EGFRCR SERPLBLD CKD-EPI 2021: >90 ML/MIN/1.73M*2
EOSINOPHIL # BLD AUTO: 0.2 X10*3/UL (ref 0–0.7)
EOSINOPHIL NFR BLD AUTO: 4.5 %
ERYTHROCYTE [DISTWIDTH] IN BLOOD BY AUTOMATED COUNT: 12.7 % (ref 11.5–14.5)
EST. AVERAGE GLUCOSE BLD GHB EST-MCNC: 103 MG/DL
FERRITIN SERPL-MCNC: 503 NG/ML (ref 20–300)
GLUCOSE SERPL-MCNC: 97 MG/DL (ref 74–99)
HBA1C MFR BLD: 5.2 %
HCT VFR BLD AUTO: 47.4 % (ref 41–52)
HDLC SERPL-MCNC: 61 MG/DL
HGB BLD-MCNC: 15.8 G/DL (ref 13.5–17.5)
IMM GRANULOCYTES # BLD AUTO: 0.01 X10*3/UL (ref 0–0.7)
IMM GRANULOCYTES NFR BLD AUTO: 0.2 % (ref 0–0.9)
IRON SATN MFR SERPL: 36 % (ref 25–45)
IRON SERPL-MCNC: 112 UG/DL (ref 35–150)
LDLC SERPL CALC-MCNC: 130 MG/DL
LYMPHOCYTES # BLD AUTO: 1.19 X10*3/UL (ref 1.2–4.8)
LYMPHOCYTES NFR BLD AUTO: 26.6 %
MAGNESIUM SERPL-MCNC: 1.93 MG/DL (ref 1.6–2.4)
MCH RBC QN AUTO: 32.2 PG (ref 26–34)
MCHC RBC AUTO-ENTMCNC: 33.3 G/DL (ref 32–36)
MCV RBC AUTO: 97 FL (ref 80–100)
MONOCYTES # BLD AUTO: 0.42 X10*3/UL (ref 0.1–1)
MONOCYTES NFR BLD AUTO: 9.4 %
NEUTROPHILS # BLD AUTO: 2.59 X10*3/UL (ref 1.2–7.7)
NEUTROPHILS NFR BLD AUTO: 58 %
NON HDL CHOLESTEROL: 162 MG/DL (ref 0–149)
NRBC BLD-RTO: 0 /100 WBCS (ref 0–0)
PLATELET # BLD AUTO: 209 X10*3/UL (ref 150–450)
POTASSIUM SERPL-SCNC: 4.4 MMOL/L (ref 3.5–5.3)
PROT SERPL-MCNC: 6.6 G/DL (ref 6.4–8.2)
RBC # BLD AUTO: 4.9 X10*6/UL (ref 4.5–5.9)
SODIUM SERPL-SCNC: 137 MMOL/L (ref 136–145)
T4 FREE SERPL-MCNC: 0.94 NG/DL (ref 0.61–1.12)
TIBC SERPL-MCNC: 315 UG/DL (ref 240–445)
TRIGL SERPL-MCNC: 160 MG/DL (ref 0–149)
TSH SERPL-ACNC: 1.7 MIU/L (ref 0.44–3.98)
UIBC SERPL-MCNC: 203 UG/DL (ref 110–370)
VIT B12 SERPL-MCNC: 331 PG/ML (ref 211–911)
VLDL: 32 MG/DL (ref 0–40)
WBC # BLD AUTO: 4.5 X10*3/UL (ref 4.4–11.3)

## 2024-06-01 PROCEDURE — 36415 COLL VENOUS BLD VENIPUNCTURE: CPT

## 2024-06-01 PROCEDURE — 80053 COMPREHEN METABOLIC PANEL: CPT

## 2024-06-01 PROCEDURE — 83550 IRON BINDING TEST: CPT

## 2024-06-01 PROCEDURE — 83735 ASSAY OF MAGNESIUM: CPT

## 2024-06-01 PROCEDURE — 85025 COMPLETE CBC W/AUTO DIFF WBC: CPT

## 2024-06-01 PROCEDURE — 82607 VITAMIN B-12: CPT

## 2024-06-01 PROCEDURE — 82728 ASSAY OF FERRITIN: CPT

## 2024-06-01 PROCEDURE — 83036 HEMOGLOBIN GLYCOSYLATED A1C: CPT

## 2024-06-01 PROCEDURE — 84439 ASSAY OF FREE THYROXINE: CPT

## 2024-06-01 PROCEDURE — 84443 ASSAY THYROID STIM HORMONE: CPT

## 2024-06-01 PROCEDURE — 80061 LIPID PANEL: CPT

## 2024-06-01 PROCEDURE — 83540 ASSAY OF IRON: CPT

## 2024-06-04 ENCOUNTER — OFFICE VISIT (OUTPATIENT)
Dept: PRIMARY CARE | Facility: CLINIC | Age: 64
End: 2024-06-04
Payer: COMMERCIAL

## 2024-06-04 VITALS
SYSTOLIC BLOOD PRESSURE: 128 MMHG | WEIGHT: 275 LBS | DIASTOLIC BLOOD PRESSURE: 84 MMHG | HEART RATE: 81 BPM | BODY MASS INDEX: 38.5 KG/M2 | HEIGHT: 71 IN

## 2024-06-04 DIAGNOSIS — Z12.5 SCREENING PSA (PROSTATE SPECIFIC ANTIGEN): ICD-10-CM

## 2024-06-04 DIAGNOSIS — E78.00 HYPERCHOLESTEROLEMIA: ICD-10-CM

## 2024-06-04 DIAGNOSIS — R73.02 GLUCOSE INTOLERANCE (IMPAIRED GLUCOSE TOLERANCE): ICD-10-CM

## 2024-06-04 DIAGNOSIS — E03.9 ACQUIRED HYPOTHYROIDISM: ICD-10-CM

## 2024-06-04 DIAGNOSIS — I10 PRIMARY HYPERTENSION: ICD-10-CM

## 2024-06-04 DIAGNOSIS — E53.8 LOW SERUM VITAMIN B12: ICD-10-CM

## 2024-06-04 DIAGNOSIS — Z00.00 WELCOME TO MEDICARE PREVENTIVE VISIT: Primary | ICD-10-CM

## 2024-06-04 DIAGNOSIS — E66.01 CLASS 2 SEVERE OBESITY DUE TO EXCESS CALORIES WITH SERIOUS COMORBIDITY AND BODY MASS INDEX (BMI) OF 38.0 TO 38.9 IN ADULT (MULTI): ICD-10-CM

## 2024-06-04 DIAGNOSIS — Z71.89 ADVANCED CARE PLANNING/COUNSELING DISCUSSION: ICD-10-CM

## 2024-06-04 DIAGNOSIS — Z00.00 ROUTINE GENERAL MEDICAL EXAMINATION AT HEALTH CARE FACILITY: ICD-10-CM

## 2024-06-04 DIAGNOSIS — R79.89 ELEVATED FERRITIN: ICD-10-CM

## 2024-06-04 PROBLEM — E66.812 CLASS 2 SEVERE OBESITY DUE TO EXCESS CALORIES WITH SERIOUS COMORBIDITY AND BODY MASS INDEX (BMI) OF 38.0 TO 38.9 IN ADULT: Status: ACTIVE | Noted: 2023-06-28

## 2024-06-04 PROCEDURE — 1036F TOBACCO NON-USER: CPT | Performed by: PHYSICIAN ASSISTANT

## 2024-06-04 PROCEDURE — 1123F ACP DISCUSS/DSCN MKR DOCD: CPT | Performed by: PHYSICIAN ASSISTANT

## 2024-06-04 PROCEDURE — 3079F DIAST BP 80-89 MM HG: CPT | Performed by: PHYSICIAN ASSISTANT

## 2024-06-04 PROCEDURE — 99214 OFFICE O/P EST MOD 30 MIN: CPT | Performed by: PHYSICIAN ASSISTANT

## 2024-06-04 PROCEDURE — G0447 BEHAVIOR COUNSEL OBESITY 15M: HCPCS | Performed by: PHYSICIAN ASSISTANT

## 2024-06-04 PROCEDURE — 3074F SYST BP LT 130 MM HG: CPT | Performed by: PHYSICIAN ASSISTANT

## 2024-06-04 PROCEDURE — 1160F RVW MEDS BY RX/DR IN RCRD: CPT | Performed by: PHYSICIAN ASSISTANT

## 2024-06-04 PROCEDURE — 3008F BODY MASS INDEX DOCD: CPT | Performed by: PHYSICIAN ASSISTANT

## 2024-06-04 PROCEDURE — 1170F FXNL STATUS ASSESSED: CPT | Performed by: PHYSICIAN ASSISTANT

## 2024-06-04 PROCEDURE — 1159F MED LIST DOCD IN RCRD: CPT | Performed by: PHYSICIAN ASSISTANT

## 2024-06-04 PROCEDURE — G0402 INITIAL PREVENTIVE EXAM: HCPCS | Performed by: PHYSICIAN ASSISTANT

## 2024-06-04 PROCEDURE — 99497 ADVNCD CARE PLAN 30 MIN: CPT | Performed by: PHYSICIAN ASSISTANT

## 2024-06-04 PROCEDURE — 1158F ADVNC CARE PLAN TLK DOCD: CPT | Performed by: PHYSICIAN ASSISTANT

## 2024-06-04 ASSESSMENT — ENCOUNTER SYMPTOMS
ABDOMINAL PAIN: 0
RHINORRHEA: 0
CONFUSION: 0
VOMITING: 0
SORE THROAT: 0
CONSTIPATION: 0
WHEEZING: 0
LOSS OF SENSATION IN FEET: 0
BRUISES/BLEEDS EASILY: 0
FEVER: 0
FATIGUE: 1
EYE DISCHARGE: 0
NUMBNESS: 0
SHORTNESS OF BREATH: 0
OCCASIONAL FEELINGS OF UNSTEADINESS: 0
BACK PAIN: 0
PALPITATIONS: 0
DIZZINESS: 0
FLANK PAIN: 0
ARTHRALGIAS: 1
TREMORS: 0
SLEEP DISTURBANCE: 0
DEPRESSION: 0
WOUND: 0
HEADACHES: 0
FREQUENCY: 0
DIARRHEA: 0
NAUSEA: 0
SINUS PAIN: 0
CHEST TIGHTNESS: 0
NECK PAIN: 0
EYE REDNESS: 0
COUGH: 0
CHILLS: 0

## 2024-06-04 ASSESSMENT — ACTIVITIES OF DAILY LIVING (ADL)
MANAGING_FINANCES: INDEPENDENT
GROCERY_SHOPPING: INDEPENDENT
BATHING: INDEPENDENT
DRESSING: INDEPENDENT
TAKING_MEDICATION: INDEPENDENT
DOING_HOUSEWORK: INDEPENDENT

## 2024-06-04 ASSESSMENT — PATIENT HEALTH QUESTIONNAIRE - PHQ9
1. LITTLE INTEREST OR PLEASURE IN DOING THINGS: NOT AT ALL
1. LITTLE INTEREST OR PLEASURE IN DOING THINGS: NOT AT ALL
SUM OF ALL RESPONSES TO PHQ9 QUESTIONS 1 AND 2: 0
2. FEELING DOWN, DEPRESSED OR HOPELESS: NOT AT ALL
SUM OF ALL RESPONSES TO PHQ9 QUESTIONS 1 AND 2: 0
2. FEELING DOWN, DEPRESSED OR HOPELESS: NOT AT ALL

## 2024-06-04 ASSESSMENT — VISUAL ACUITY
OS_CC: 20/20
OD_CC: 20/20

## 2024-06-04 NOTE — PROGRESS NOTES
Subjective   Reason for Visit: Devon Medina is an 64 y.o. male here for a Medicare Wellness visit.     Past Medical, Surgical, and Family History reviewed and updated in chart.    Reviewed all medications by prescribing practitioner or clinical pharmacist (such as prescriptions, OTCs, herbal therapies and supplements) and documented in the medical record.      Advanced Care Planing discussed.  Diagnosis, treatment and prognosis discussed with patient.  Patient has capacity to make his/her own decision.  Patient has a living will.  Patient is advised to bring a copy of this documenation for the chart. >16 min was spent with patient counseling.      Over 15 minutes was spent discussing obesity - how this affects lab results, complicates medical problems and discussed various treatment options including diet and exercise and medications if needed      HPI  Welcome to medicare     Labs      Med check   hypothyroid -taking meds -225 mcg daily  HTN - home readings are typically 110-130s/70-80  mag - cont on supplement   gl[facility] - on drop [facility]- following with ortho/pod on occasion - pt feels it is getting worse - leg /foot cramping and discomfort . He denies following with ortho or pod in sometime and states he is dealing with so much pain he is needing to take ibuprofen 600-800 at bedtime to help him get out of bed the next morning   elevated ferritin - additional labs done and he was referred to kenneth who he is following with at this time.   LE edema /lymphedmea /chronic venous insuff - following with lymphedema clinic in [place] and has moses hose and overall back to good baseline - not on lasix/K at this time - discussed water therapy but didn't want to do in [place] - is worried about the cost     Preventative testing   PSA - oct 2023   colonoscopy - oct 2022 -polyps - repeat in 3 years   Depression Screen - PHQ2 NEG June 2023  Fall -May 2024- tripped over  "landscaping and laceration L knee      flu shot given in office fall 2023     Patient Care Team:  Alpa Saini PA-C as PCP - General  Alpa Saini PA-C as PCP - [facility] PCP  Alpa Saini PA-C as PCP - [facility] Medicaid PCP     Review of Systems   Constitutional:  Positive for fatigue. Negative for chills and fever.   HENT:  Negative for congestion, rhinorrhea, sinus pain, sore throat and tinnitus.    Eyes:  Negative for discharge, redness and visual disturbance.   Respiratory:  Negative for cough, chest tightness, shortness of breath and wheezing.    Cardiovascular:  Negative for chest pain, palpitations and leg swelling.   Gastrointestinal:  Negative for abdominal pain, constipation, diarrhea, nausea and vomiting.   Endocrine: Negative for cold intolerance and heat intolerance.   Genitourinary:  Negative for flank pain, frequency and urgency.   Musculoskeletal:  Positive for arthralgias and gait problem. Negative for back pain and neck pain.   Skin:  Negative for rash and wound.   Neurological:  Negative for dizziness, tremors, syncope, numbness and headaches.   Hematological:  Does not bruise/bleed easily.   Psychiatric/Behavioral:  Negative for confusion, sleep disturbance and suicidal ideas.        Objective   Vitals:  /84   Pulse 81   Ht 1.803 m (5' 11\")   Wt 125 kg (275 lb)   BMI 38.35 kg/m²       Physical Exam  Vitals reviewed.   Constitutional:       Appearance: Normal appearance. He is obese.   HENT:      Head: Normocephalic.      Right Ear: External ear normal.      Left Ear: External ear normal.      Nose: Nose normal. No congestion or rhinorrhea.      Mouth/Throat:      Mouth: Mucous membranes are moist.   Eyes:      Extraocular Movements: Extraocular movements intact.      Conjunctiva/sclera: Conjunctivae normal.      Pupils: Pupils are equal, round, and reactive to light.   Cardiovascular:      Rate and Rhythm: Normal rate and regular rhythm.      Pulses: Normal " pulses.   Pulmonary:      Effort: Pulmonary effort is normal.      Breath sounds: Normal breath sounds.   Abdominal:      General: Bowel sounds are normal.      Palpations: Abdomen is soft.      Tenderness: There is no abdominal tenderness. There is no right CVA tenderness or left CVA tenderness.   Musculoskeletal:         General: Tenderness present. Normal range of motion.      Cervical back: Normal range of motion and neck supple. No tenderness.   Skin:     General: Skin is warm and dry.   Neurological:      General: No focal deficit present.      Mental Status: He is alert and oriented to person, place, and time.   Psychiatric:         Mood and Affect: Mood normal.         Behavior: Behavior normal.         Testing   Component      Latest Ref [facility] 6/1/2024   LEUKOCYTES (10*3/UL) IN BLOOD BY AUTOMATED COUNT, Salv[place]orean      4.4 - 11.3 x10*3/uL 4.5    nRBC      0.0 - 0.0 /100 WBCs 0.0    ERYTHROCYTES (10*6/UL) IN BLOOD BY AUTOMATED COUNT, Salvadorean      4.50 - 5.90 x10*6/uL 4.90    HEMOGLOBIN      13.5 - 17.5 g/dL 15.8    HEMATOCRIT      41.0 - 52.0 % 47.4    MCV      80 - 100 fL 97    MCH      26.0 - 34.0 pg 32.2    MCHC      32.0 - 36.0 g/dL 33.3    RED CELL DISTRIBUTION WIDTH      11.5 - 14.5 % 12.7    PLATELETS (10*3/UL) IN BLOOD AUTOMATED COUNT, Salvadorean      150 - 450 x10*3/uL 209    NEUTROPHILS/100 LEUKOCYTES IN BLOOD BY AUTOMATED COUNT, Salvadorean      40.0 - 80.0 % 58.0    Immature Granulocytes %, Automated      0.0 - 0.9 % 0.2    Lymphocytes %      13.0 - 44.0 % 26.6    Monocytes %      2.0 - 10.0 % 9.4    Eosinophils %      0.0 - 6.0 % 4.5    Basophils %      0.0 - 2.0 % 1.3    NEUTROPHILS (10*3/UL) IN BLOOD BY AUTOMATED COUNT, [place]orean      1.20 - 7.70 x10*3/uL 2.59    Immature Granulocytes Absolute, Automated      0.00 - 0.70 x10*3/uL 0.01    Lymphocytes Absolute      1.20 - 4.80 x10*3/uL 1.19 (L)    Monocytes Absolute      0.10 - 1.00 x10*3/uL 0.42    Eosinophils Absolute      0.00 - 0.70 x10*3/uL 0.20     Basophils Absolute      0.00 - 0.10 x10*3/uL 0.06    GLUCOSE      74 - 99 mg/dL 97    SODIUM      136 - 145 mmol/L 137    POTASSIUM      3.5 - 5.3 mmol/L 4.4    CHLORIDE      98 - 107 mmol/L 105    Bicarbonate      21 - 32 mmol/L 26    Anion Gap      10 - 20 mmol/L 10    Blood Urea Nitrogen      6 - 23 mg/dL 22    Creatinine      0.50 - 1.30 mg/dL 0.92    EGFR      >60 mL/min/1.73m*2 >90    Calcium      8.6 - 10.3 mg/dL 9.0    Albumin      3.4 - 5.0 g/dL 4.2    Alkaline Phosphatase      33 - 136 U/L 72    Total Protein      6.4 - 8.2 g/dL 6.6    AST      9 - 39 U/L 26    Bilirubin Total      0.0 - 1.2 mg/dL 0.7    ALT      10 - 52 U/L 25    CHOLESTEROL      0 - 199 mg/dL 223 (H)    HDL CHOLESTEROL      mg/dL 61.0    Cholesterol/HDL Ratio 3.7    LDL Calculated      <=99 mg/dL 130 (H)    VLDL      0 - 40 mg/dL 32    TRIGLYCERIDES      0 - 149 mg/dL 160 (H)    Non HDL Cholesterol      0 - 149 mg/dL 162 (H)    IRON      35 - 150 ug/dL 112    UIBC      110 - 370 ug/dL 203    TIBC      240 - 445 ug/dL 315    % Saturation      25 - 45 % 36    Hemoglobin A1C      see below % 5.2    Estimated Average Glucose      Not Established mg/dL 103    Thyroid Stimulating Hormone      0.44 - 3.98 mIU/L 1.70    Thyroxine, Free      0.61 - 1.12 ng/dL 0.94    FERRITIN      20 - 300 ng/mL 503 (H)    MAGNESIUM      1.60 - 2.40 mg/dL 1.93    Vitamin B12      211 - 911 pg/mL 331       Impression     MDM    1) COMPLEXITY: MORE THAN 1 STABLE CHRONIC CONDITION ADDRESSED  2)DATA: TESTS INTERPRETED AND OR ORDERED, TOOK INDEPENDENT HISTORY OR RECORDS REVIEWED  3)RISK: MODERATE RISK DUE TO NATURE OF MEDICAL CONDITIONS/COMORBIDITY OR MEDICATIONS ORDERED OR SURGICAL OR PROCEDURE REFERRAL, .       Reviewed labs and Testing on file   Patient to follow diet low in cholesterol, fat, and sodium.    Patient is advised to increase Exercise.  Patient is recommended to lose weight.  Reviewed Meds and discussed common side effects  Continue as directed   Cont  with specialists   Patient is strongly advised to be compliant with recommendations.    Return to Clinic sooner if needed.  Patient denies further questions/concerns at this time       Assessment/Plan   Problem List Items Addressed This Visit       Elevated ferritin    Overview     ELEVATED FERRITIN         Glucose intolerance (impaired glucose tolerance)    Relevant Orders    Comprehensive Metabolic Panel    Hemoglobin A1C    Thyroid Stimulating Hormone    Thyroxine, Free    Magnesium    Prostate Specific Antigen, Screen    Hypercholesterolemia    Hypertension    Hypothyroidism    Relevant Orders    Thyroid Stimulating Hormone    Thyroxine, Free    Low serum vitamin B12    Class 2 severe obesity due to excess calories with serious comorbidity and body mass index (BMI) of 38.0 to 38.9 in adult (Multi)     Other Visit Diagnoses       Welcome to Medicare preventive visit    -  Primary    Routine general medical examination at health care facility        Screening PSA (prostate specific antigen)        Relevant Orders    Prostate Specific Antigen, Screen    Advanced care planning/counseling discussion                   FU in Jan with medicare wellness and labs at [facility] fasting and med check

## 2024-07-08 DIAGNOSIS — E03.9 ACQUIRED HYPOTHYROIDISM: ICD-10-CM

## 2024-07-08 RX ORDER — LEVOTHYROXINE SODIUM 200 UG/1
200 TABLET ORAL DAILY
Qty: 30 TABLET | Refills: 11 | Status: SHIPPED | OUTPATIENT
Start: 2024-07-08

## 2024-07-18 DIAGNOSIS — I10 PRIMARY HYPERTENSION: ICD-10-CM

## 2024-07-18 RX ORDER — LOSARTAN POTASSIUM 50 MG/1
50 TABLET ORAL 2 TIMES DAILY
Qty: 60 TABLET | Refills: 5 | Status: SHIPPED | OUTPATIENT
Start: 2024-07-18

## 2024-08-28 ENCOUNTER — LAB (OUTPATIENT)
Dept: LAB | Facility: LAB | Age: 64
End: 2024-08-28
Payer: COMMERCIAL

## 2024-08-28 DIAGNOSIS — E53.8 LOW SERUM VITAMIN B12: ICD-10-CM

## 2024-08-28 DIAGNOSIS — R79.89 ELEVATED FERRITIN: ICD-10-CM

## 2024-08-28 LAB
ALBUMIN SERPL BCP-MCNC: 4.3 G/DL (ref 3.4–5)
ALP SERPL-CCNC: 69 U/L (ref 33–136)
ALT SERPL W P-5'-P-CCNC: 23 U/L (ref 10–52)
ANION GAP SERPL CALC-SCNC: 14 MMOL/L (ref 10–20)
AST SERPL W P-5'-P-CCNC: 22 U/L (ref 9–39)
BASOPHILS # BLD AUTO: 0.06 X10*3/UL (ref 0–0.1)
BASOPHILS NFR BLD AUTO: 1.1 %
BILIRUB SERPL-MCNC: 1 MG/DL (ref 0–1.2)
BUN SERPL-MCNC: 22 MG/DL (ref 6–23)
CALCIUM SERPL-MCNC: 9.1 MG/DL (ref 8.6–10.3)
CHLORIDE SERPL-SCNC: 104 MMOL/L (ref 98–107)
CO2 SERPL-SCNC: 22 MMOL/L (ref 21–32)
CREAT SERPL-MCNC: 1.13 MG/DL (ref 0.5–1.3)
CRP SERPL-MCNC: 0.18 MG/DL
EGFRCR SERPLBLD CKD-EPI 2021: 72 ML/MIN/1.73M*2
EOSINOPHIL # BLD AUTO: 0.22 X10*3/UL (ref 0–0.7)
EOSINOPHIL NFR BLD AUTO: 4.2 %
ERYTHROCYTE [DISTWIDTH] IN BLOOD BY AUTOMATED COUNT: 12.2 % (ref 11.5–14.5)
ERYTHROCYTE [SEDIMENTATION RATE] IN BLOOD BY WESTERGREN METHOD: 8 MM/H (ref 0–20)
FERRITIN SERPL-MCNC: 496 NG/ML (ref 20–300)
GLUCOSE SERPL-MCNC: 110 MG/DL (ref 74–99)
HCT VFR BLD AUTO: 45.9 % (ref 41–52)
HGB BLD-MCNC: 15.6 G/DL (ref 13.5–17.5)
IMM GRANULOCYTES # BLD AUTO: 0.06 X10*3/UL (ref 0–0.7)
IMM GRANULOCYTES NFR BLD AUTO: 1.1 % (ref 0–0.9)
IRON SATN MFR SERPL: 37 % (ref 25–45)
IRON SERPL-MCNC: 118 UG/DL (ref 35–150)
LYMPHOCYTES # BLD AUTO: 1.15 X10*3/UL (ref 1.2–4.8)
LYMPHOCYTES NFR BLD AUTO: 21.9 %
MCH RBC QN AUTO: 32.8 PG (ref 26–34)
MCHC RBC AUTO-ENTMCNC: 34 G/DL (ref 32–36)
MCV RBC AUTO: 96 FL (ref 80–100)
MONOCYTES # BLD AUTO: 0.69 X10*3/UL (ref 0.1–1)
MONOCYTES NFR BLD AUTO: 13.1 %
NEUTROPHILS # BLD AUTO: 3.07 X10*3/UL (ref 1.2–7.7)
NEUTROPHILS NFR BLD AUTO: 58.6 %
NRBC BLD-RTO: 0 /100 WBCS (ref 0–0)
PLATELET # BLD AUTO: 222 X10*3/UL (ref 150–450)
POTASSIUM SERPL-SCNC: 3.8 MMOL/L (ref 3.5–5.3)
PROT SERPL-MCNC: 7 G/DL (ref 6.4–8.2)
RBC # BLD AUTO: 4.76 X10*6/UL (ref 4.5–5.9)
SODIUM SERPL-SCNC: 136 MMOL/L (ref 136–145)
TIBC SERPL-MCNC: 317 UG/DL (ref 240–445)
UIBC SERPL-MCNC: 199 UG/DL (ref 110–370)
VIT B12 SERPL-MCNC: 496 PG/ML (ref 211–911)
WBC # BLD AUTO: 5.3 X10*3/UL (ref 4.4–11.3)

## 2024-08-28 PROCEDURE — 36415 COLL VENOUS BLD VENIPUNCTURE: CPT

## 2024-08-28 PROCEDURE — 85652 RBC SED RATE AUTOMATED: CPT

## 2024-08-28 PROCEDURE — 83540 ASSAY OF IRON: CPT

## 2024-08-28 PROCEDURE — 83550 IRON BINDING TEST: CPT

## 2024-08-28 PROCEDURE — 86140 C-REACTIVE PROTEIN: CPT

## 2024-08-28 PROCEDURE — 85025 COMPLETE CBC W/AUTO DIFF WBC: CPT

## 2024-08-28 PROCEDURE — 82607 VITAMIN B-12: CPT

## 2024-08-28 PROCEDURE — 82728 ASSAY OF FERRITIN: CPT

## 2024-08-28 PROCEDURE — 80053 COMPREHEN METABOLIC PANEL: CPT

## 2024-08-28 PROCEDURE — 83735 ASSAY OF MAGNESIUM: CPT

## 2024-08-29 NOTE — PROGRESS NOTES
Patient ID: Rosalba Ba is a 64 y.o. male.    Subjective    HPI  Patient ID: Rosalba Ba is a 64 y.o. male.        Subjective  HPI     Patient Visit Information:   Visit Type: Follow Up Visit      History of Present Illness:      ID Statement:    ROSALBA BA is a 63 year old Male        Chief Complaint: Evaluation for elevated ferritin   Interval History:    Referred by Alpa Saini PA-C      Reason for referral: elevated ferritin      HPI   62 year old man with hx of hypothyroidism, obesity, HTN, HL, leg-Calve-Perthes disease  (idiopathic AVN of hip) who presents for evaluation of elevated ferritin. His labs showed elevated ferritin 503-554 since Oct 2021, Tsat 31-43%.  He is feeling ok in general   he had no recent infections or fever   walks usually with his cane, limited mobility secondary to his hip problems   he does not have a lot of pain though   eats well, no nausea or vomiting   no bleeding, no hematuria, hematochezia or melena    energy is fair   he is not very active though      interval history- 8/30/24     Right leg pain behind the knee started about 5 weeks   No swelling in the generalized area  Pt denies any trauma or long distance travel   Denies any numbness or tingling in hands/feet  Denies any SOB  Reports had some difficulty breathing a few weeks ago, but all has resolved    Energy is decent  Remains walking with a cane  Chronic lower back and right hip pain, remains on 800 mg ibuprofen, helps manage the pain    No numbness tingling hands or feet   continues to wear compression stocking for edema      Appetite is good, eating well trying to healthier  drinking water and lots of coffee  no new falls, no new hospital admissions  No GI symptoms   No urinary issues   No hematuria, hematochezia or melena   no fevers, chills or infections  No SOB  no chest pain or pressure   No abdominal pain or cramping   Restarted having some beers on the weekends          Objective    BSA: There is no  height or weight on file to calculate BSA.  There were no vitals taken for this visit.     Physical Exam  Vitals and nursing note reviewed.   Constitutional:       Appearance: Normal appearance.   HENT:      Head: Normocephalic and atraumatic.      Nose: Nose normal.      Mouth/Throat:      Mouth: Mucous membranes are moist.      Pharynx: Oropharynx is clear.   Eyes:      General: No scleral icterus.     Extraocular Movements: Extraocular movements intact.      Conjunctiva/sclera: Conjunctivae normal.   Cardiovascular:      Rate and Rhythm: Normal rate and regular rhythm.      Pulses: Normal pulses.      Heart sounds: Normal heart sounds.   Pulmonary:      Effort: Pulmonary effort is normal.      Breath sounds: Normal breath sounds.   Abdominal:      Palpations: Abdomen is soft.      Tenderness: There is no abdominal tenderness.   Musculoskeletal:         General: Swelling and tenderness present. Normal range of motion.   Skin:     General: Skin is warm and dry.   Neurological:      General: No focal deficit present.      Mental Status: He is alert and oriented to person, place, and time.   Psychiatric:         Mood and Affect: Mood normal.         Behavior: Behavior normal.         Thought Content: Thought content normal.         Judgment: Judgment normal.         Performance Status:  Symptomatic; fully ambulatory      Assessment/Plan      1. 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 States feels like a bad omar horse in his calf. He denies any known trauma to the leg, no recent log distance travel. Stopped walking on the treadmill, due to being so busy around the house and from the leg pain. Mild SOB a few weeks ago, that has resolved. Denies any chest pain or pressure. Appetite is good, he is trying to stay hydrated by drinking plenty of water. Chronic lower back pain, and right hip pain unchanged, manages with OTC ibuprofen, as needed. Admits to consuming alcohol again mainly on the weekends, to cope with stress. Emphasized the importance of abstaining from consuming alcohol.      Ferritin remains elevated but stable at 496. Discussed that we continue to hold of on proceeding with phlebotomies at this time. Will recheck labs in 4-5 months and consider phlebotomy if Ferritin remains >500.       Discussed ordering a right venous doppler to rule out a blood clot, pt is agreeable, will schedule accordingly. Educated patient on signs and symptoms that warrant urgent care.    9/3/24: Notified regarding Doppler results- positive for acute occlusive DVT in the gastrocnemius veins in the calf, also reported an age indeterminate DVT visualized in the popliteal vein and chronic changes visualized in the proximal femoral, mid femoral and distal femoral veins.     - pt was notified of the results- instructed to start oral Eliquis 5mg (2 tablets (10 mg) by mouth 2 times a day for 7 days, then take 1 tablet (5 mg) by mouth 2 times a day).   - Stat CT angio was ordered to rule out Pulmonary Embolism.   -hypercoag labs were ordered     - CT reviewed- acute occlusive pulmonary embolism involving segmental and subsegmental posteromedial right lower lobe pulmonary arteries. Mildly elevated RV/LV ratio 1.22, indicating an element of right heart strain    -attempted to notify pt of results, was unable to reach pt. VM  was left instructing pt to contact the office. Patient was encouraged to go to the Emergency Room for further evaluation if he was experiencing any SOB, chest pain, or pressure.        Plan:   RTC in 1 month to discuss Hypercoag panel and toxicity check for CRISTÓBAL Villalpando-CNP

## 2024-08-30 ENCOUNTER — OFFICE VISIT (OUTPATIENT)
Dept: HEMATOLOGY/ONCOLOGY | Facility: CLINIC | Age: 64
End: 2024-08-30
Payer: COMMERCIAL

## 2024-08-30 VITALS
SYSTOLIC BLOOD PRESSURE: 128 MMHG | TEMPERATURE: 97.9 F | DIASTOLIC BLOOD PRESSURE: 86 MMHG | RESPIRATION RATE: 16 BRPM | WEIGHT: 282.6 LBS | HEART RATE: 72 BPM | BODY MASS INDEX: 39.41 KG/M2 | OXYGEN SATURATION: 97 %

## 2024-08-30 DIAGNOSIS — E53.8 LOW SERUM VITAMIN B12: ICD-10-CM

## 2024-08-30 DIAGNOSIS — R79.89 ELEVATED FERRITIN: ICD-10-CM

## 2024-08-30 DIAGNOSIS — M79.604 RIGHT LEG PAIN: Primary | ICD-10-CM

## 2024-08-30 DIAGNOSIS — I26.99 ACUTE PULMONARY EMBOLISM, UNSPECIFIED PULMONARY EMBOLISM TYPE, UNSPECIFIED WHETHER ACUTE COR PULMONALE PRESENT (MULTI): ICD-10-CM

## 2024-08-30 LAB — MAGNESIUM SERPL-MCNC: 2.11 MG/DL (ref 1.6–2.4)

## 2024-08-30 PROCEDURE — 3074F SYST BP LT 130 MM HG: CPT

## 2024-08-30 PROCEDURE — 1159F MED LIST DOCD IN RCRD: CPT

## 2024-08-30 PROCEDURE — 1126F AMNT PAIN NOTED NONE PRSNT: CPT

## 2024-08-30 PROCEDURE — 99214 OFFICE O/P EST MOD 30 MIN: CPT

## 2024-08-30 PROCEDURE — 3079F DIAST BP 80-89 MM HG: CPT

## 2024-08-30 ASSESSMENT — COLUMBIA-SUICIDE SEVERITY RATING SCALE - C-SSRS
1. IN THE PAST MONTH, HAVE YOU WISHED YOU WERE DEAD OR WISHED YOU COULD GO TO SLEEP AND NOT WAKE UP?: NO
2. HAVE YOU ACTUALLY HAD ANY THOUGHTS OF KILLING YOURSELF?: NO
6. HAVE YOU EVER DONE ANYTHING, STARTED TO DO ANYTHING, OR PREPARED TO DO ANYTHING TO END YOUR LIFE?: NO

## 2024-08-30 ASSESSMENT — PAIN SCALES - GENERAL: PAINLEVEL: 0-NO PAIN

## 2024-08-30 NOTE — PROGRESS NOTES
Pt set up for venous doppler 9/3 730 am arrival to 2nd floor  Lab at the [facility] 2/24  Rtc 2/27 9am with CNP  Reviewed AVS with patient- patient verbalizes understanding

## 2024-08-30 NOTE — PATIENT INSTRUCTIONS
Ferritin is stable at 496  Will schedule a doppler to check pain behind the knee  Will check magnesium level  RTC in 6 months with labs prior (CBC w/diff, CMP, Ferritin)

## 2024-09-03 ENCOUNTER — HOSPITAL ENCOUNTER (OUTPATIENT)
Dept: VASCULAR MEDICINE | Facility: HOSPITAL | Age: 64
Discharge: HOME | End: 2024-09-03
Payer: COMMERCIAL

## 2024-09-03 ENCOUNTER — HOSPITAL ENCOUNTER (OUTPATIENT)
Dept: RADIOLOGY | Facility: HOSPITAL | Age: 64
Discharge: HOME | End: 2024-09-03
Payer: COMMERCIAL

## 2024-09-03 ENCOUNTER — PHARMACY VISIT (OUTPATIENT)
Dept: PHARMACY | Facility: CLINIC | Age: 64
End: 2024-09-03
Payer: COMMERCIAL

## 2024-09-03 ENCOUNTER — LAB (OUTPATIENT)
Dept: LAB | Facility: LAB | Age: 64
End: 2024-09-03
Payer: COMMERCIAL

## 2024-09-03 ENCOUNTER — TELEPHONE (OUTPATIENT)
Dept: HEMATOLOGY/ONCOLOGY | Facility: CLINIC | Age: 64
End: 2024-09-03

## 2024-09-03 DIAGNOSIS — M79.604 RIGHT LEG PAIN: ICD-10-CM

## 2024-09-03 DIAGNOSIS — I82.439 DEEP VEIN THROMBOSIS (DVT) OF POPLITEAL VEIN, UNSPECIFIED CHRONICITY, UNSPECIFIED LATERALITY (MULTI): ICD-10-CM

## 2024-09-03 DIAGNOSIS — I82.439 ACUTE DEEP VEIN THROMBOSIS (DVT) OF POPLITEAL VEIN, UNSPECIFIED LATERALITY (MULTI): ICD-10-CM

## 2024-09-03 DIAGNOSIS — I82.439 DEEP VEIN THROMBOSIS (DVT) OF POPLITEAL VEIN, UNSPECIFIED CHRONICITY, UNSPECIFIED LATERALITY (MULTI): Primary | ICD-10-CM

## 2024-09-03 LAB
APTT PPP: 30 SECONDS (ref 27–38)
D DIMER PPP FEU-MCNC: 1810 NG/ML FEU
INR PPP: 0.9 (ref 0.9–1.1)
PROTHROMBIN TIME: 10.8 SECONDS (ref 9.8–12.8)

## 2024-09-03 PROCEDURE — 86146 BETA-2 GLYCOPROTEIN ANTIBODY: CPT

## 2024-09-03 PROCEDURE — 93971 EXTREMITY STUDY: CPT

## 2024-09-03 PROCEDURE — 2550000001 HC RX 255 CONTRASTS

## 2024-09-03 PROCEDURE — 85301 ANTITHROMBIN III ANTIGEN: CPT

## 2024-09-03 PROCEDURE — 86147 CARDIOLIPIN ANTIBODY EA IG: CPT

## 2024-09-03 PROCEDURE — 85302 CLOT INHIBIT PROT C ANTIGEN: CPT

## 2024-09-03 PROCEDURE — 71275 CT ANGIOGRAPHY CHEST: CPT

## 2024-09-03 PROCEDURE — 36415 COLL VENOUS BLD VENIPUNCTURE: CPT

## 2024-09-03 PROCEDURE — 85730 THROMBOPLASTIN TIME PARTIAL: CPT

## 2024-09-03 PROCEDURE — 71275 CT ANGIOGRAPHY CHEST: CPT | Performed by: RADIOLOGY

## 2024-09-03 PROCEDURE — 85379 FIBRIN DEGRADATION QUANT: CPT

## 2024-09-03 PROCEDURE — RXMED WILLOW AMBULATORY MEDICATION CHARGE

## 2024-09-03 PROCEDURE — 85610 PROTHROMBIN TIME: CPT

## 2024-09-03 PROCEDURE — 93971 EXTREMITY STUDY: CPT | Performed by: SURGERY

## 2024-09-04 ENCOUNTER — HOSPITAL ENCOUNTER (EMERGENCY)
Facility: HOSPITAL | Age: 64
Discharge: HOME | End: 2024-09-04
Attending: EMERGENCY MEDICINE
Payer: COMMERCIAL

## 2024-09-04 ENCOUNTER — TELEPHONE (OUTPATIENT)
Dept: HEMATOLOGY/ONCOLOGY | Facility: CLINIC | Age: 64
End: 2024-09-04
Payer: COMMERCIAL

## 2024-09-04 VITALS
RESPIRATION RATE: 18 BRPM | DIASTOLIC BLOOD PRESSURE: 89 MMHG | OXYGEN SATURATION: 98 % | TEMPERATURE: 97.5 F | WEIGHT: 282 LBS | HEART RATE: 69 BPM | BODY MASS INDEX: 39.48 KG/M2 | SYSTOLIC BLOOD PRESSURE: 130 MMHG | HEIGHT: 71 IN

## 2024-09-04 DIAGNOSIS — I26.99 ACUTE PULMONARY EMBOLISM, UNSPECIFIED PULMONARY EMBOLISM TYPE, UNSPECIFIED WHETHER ACUTE COR PULMONALE PRESENT (MULTI): ICD-10-CM

## 2024-09-04 DIAGNOSIS — I26.99 ACUTE PULMONARY EMBOLISM WITHOUT ACUTE COR PULMONALE, UNSPECIFIED PULMONARY EMBOLISM TYPE (MULTI): Primary | ICD-10-CM

## 2024-09-04 DIAGNOSIS — I82.4Y1 ACUTE DEEP VEIN THROMBOSIS (DVT) OF PROXIMAL VEIN OF RIGHT LOWER EXTREMITY (MULTI): ICD-10-CM

## 2024-09-04 DIAGNOSIS — I26.99 PULMONARY EMBOLISM, UNSPECIFIED CHRONICITY, UNSPECIFIED PULMONARY EMBOLISM TYPE, UNSPECIFIED WHETHER ACUTE COR PULMONALE PRESENT (MULTI): Primary | ICD-10-CM

## 2024-09-04 DIAGNOSIS — I26.99 ACUTE PULMONARY EMBOLISM, UNSPECIFIED PULMONARY EMBOLISM TYPE, UNSPECIFIED WHETHER ACUTE COR PULMONALE PRESENT (MULTI): Primary | ICD-10-CM

## 2024-09-04 LAB
B2 GLYCOPROT1 IGA SER-ACNC: <0.6 U/ML
B2 GLYCOPROT1 IGG SER-ACNC: <1.4 U/ML
B2 GLYCOPROT1 IGM SER-ACNC: 4.1 U/ML
BNP SERPL-MCNC: 82 PG/ML (ref 0–99)
CARDIAC TROPONIN I PNL SERPL HS: 4 NG/L (ref 0–20)
CARDIOLIPIN IGA SERPL-ACNC: 0.6 APL U/ML
CARDIOLIPIN IGG SER IA-ACNC: <1.6 GPL U/ML
CARDIOLIPIN IGM SER IA-ACNC: 5.6 MPL U/ML
LACTATE SERPL-SCNC: 1.3 MMOL/L (ref 0.4–2)

## 2024-09-04 PROCEDURE — 83880 ASSAY OF NATRIURETIC PEPTIDE: CPT | Performed by: EMERGENCY MEDICINE

## 2024-09-04 PROCEDURE — 83605 ASSAY OF LACTIC ACID: CPT | Performed by: EMERGENCY MEDICINE

## 2024-09-04 PROCEDURE — 36415 COLL VENOUS BLD VENIPUNCTURE: CPT | Performed by: EMERGENCY MEDICINE

## 2024-09-04 PROCEDURE — 99283 EMERGENCY DEPT VISIT LOW MDM: CPT

## 2024-09-04 PROCEDURE — 84484 ASSAY OF TROPONIN QUANT: CPT | Performed by: EMERGENCY MEDICINE

## 2024-09-04 ASSESSMENT — VISUAL ACUITY: OU: 1

## 2024-09-04 ASSESSMENT — PAIN - FUNCTIONAL ASSESSMENT: PAIN_FUNCTIONAL_ASSESSMENT: 0-10

## 2024-09-04 ASSESSMENT — PAIN SCALES - GENERAL: PAINLEVEL_OUTOF10: 0 - NO PAIN

## 2024-09-04 NOTE — SIGNIFICANT EVENT
"PULMONARY EMBOLISM RESPONSE TEAM (PERT) NOTE    This note represents a summary of a virtual evaluation by the pulmonary embolism response team requested Suggestions and impression are summarized from the initial virtual encounter and discussion with the referring medical team. These suggestions supplement but should not be a substitute for bedside evaluation and management by the attending of record.     PERT Members on the Call:  Critical Care Attending  PERT Interventional Cardiology Attending  Vascular Medicine Attending  Critical Care Fellow    Brief Clinical Summary:  Devon Medina is a 64 y.o. male presenting with leg pain, had positive DVT study and was sent for CTPA and found to have PE.    Vital Signs  BP (!) 141/91   Pulse 98   Temp 36.4 °C (97.5 °F)   Resp 16   Ht 1.803 m (5' 11\")   Wt 128 kg (282 lb)   SpO2 98%   BMI 39.33 kg/m²      Laboratory  Recent Labs     12/23/22  1257   BNP 75   LACTATE 1.3         PESI Score Earle CHASE Et al. Arch Intern Med. 2010;[id number].           PESI Score:  75, consistent with JLPESIRISK: Class II, or Low risk (1.7-3.5% 30-day mortality risk) PE.    CT Scan reviewed:  Acute occlusive pulmonary embolism involving segmental and  subsegmental posteromedial right lower lobe pulmonary arteries.    Mildly elevated RV/LV ratio at 1.22. This does indicate an element of  right heart strain.    TTE reviewed (if available):  No    Venous duplex (if available):  Right Lower Venous: There is acute occlusive deep vein thrombosis visualized in the gastrocnemius veins in the calf vein. There is age indeterminate deep vein thrombosis visualized in the popliteal vein. There are chronic changes visualized in the proximal femoral, mid femoral and distal Femoral veins. The remainder of the right leg is negative for deep vein thrombosis.    Left Lower Venous: The left common femoral vein demonstrates normal spontaneous and respirophasic flow.    Contraindications to anticoagulation: " none.  Contraindications to thrombolytics: none.    Initial Impressions:  ERS/ESC Pulmonary Embolism Risk Category: JLPECLASS: Intermediate-low risk.      Initial Suggestions/Plans:  Continue car at the current location.  Initial therapy suggested: Anticoagulation.  No intervention is indicated.   Additional testing recommended: ECHO, Cardiac markers.  Additional suggestions for re-evaluation/reconference or retesting: If cardiac biomarkers and/or ECHO are abnormal, we recommend cardiology consult.    To re conference the PERT team please call the  Transfer Center at [phone].

## 2024-09-04 NOTE — DISCHARGE INSTRUCTIONS
The PERT team recommends that you get an outpatient echocardiogram in the near future.  Make sure you take the Eliquis as prescribed.

## 2024-09-04 NOTE — ED PROVIDER NOTES
DVT, pulmonary embolism.  This 65-year-old white male presents to the ED per the recommendation of his primary care doctor after being diagnosed with a deep vein thrombosis and pulmonary embolism.  He states that yesterday morning he had a venous duplex Doppler performed that revealed a DVT was then started on Eliquis and told to get a CT of the chest yesterday which occurred around 2 PM.  She states that his primary care doctor was able to get in touch with him this morning and recommended he go to the ED for evaluation.  Patient currently is asymptomatic denies any chest pain or shortness of breath.  He states that prior to getting the venous duplex Doppler he been having right lower extremity pain for the past 5-6 weeks after he power wash his house.  He denies any shortness of breath with activity.      History provided by:  Patient   used: No         Physical Exam  Vitals and nursing note reviewed.   Constitutional:       Appearance: Normal appearance. He is obese.   HENT:      Head: Normocephalic and atraumatic.      Right Ear: Hearing and external ear normal.      Left Ear: Hearing and external ear normal.      Nose: Nose normal. No congestion or rhinorrhea.      Mouth/Throat:      Lips: Pink.      Mouth: Mucous membranes are moist.      Pharynx: Oropharynx is clear. Uvula midline. No oropharyngeal exudate or posterior oropharyngeal erythema.   Eyes:      General: Lids are normal. Vision grossly intact.         Right eye: No discharge.         Left eye: No discharge.      Extraocular Movements: Extraocular movements intact.      Conjunctiva/sclera: Conjunctivae normal.      Pupils: Pupils are equal, round, and reactive to light.   Cardiovascular:      Rate and Rhythm: Normal rate and regular rhythm.      Pulses: Normal pulses.      Heart sounds: Normal heart sounds. No murmur heard.     No friction rub. No gallop.   Pulmonary:      Effort: Pulmonary effort is normal. No respiratory  distress.      Breath sounds: Normal breath sounds. No stridor. No wheezing, rhonchi or rales.   Chest:      Chest wall: No tenderness.   Abdominal:      General: Abdomen is protuberant. Bowel sounds are normal. There is no distension.      Palpations: Abdomen is soft. There is no mass.      Tenderness: There is no abdominal tenderness. There is no guarding or rebound.      Hernia: No hernia is present.      Comments: Patient has a benign abdominal exam.   Musculoskeletal:         General: No swelling, tenderness, deformity or signs of injury. Normal range of motion.      Cervical back: Full passive range of motion without pain, normal range of motion and neck supple.      Right lower leg: 3+ Edema present.      Left lower leg: 3+ Edema present.   Skin:     General: Skin is warm and dry.      Capillary Refill: Capillary refill takes less than 2 seconds.      Coloration: Skin is not jaundiced or pale.      Findings: No bruising, erythema, lesion or rash.   Neurological:      General: No focal deficit present.      Mental Status: He is alert and oriented to person, place, and time.      GCS: GCS eye subscore is 4. GCS verbal subscore is 5. GCS motor subscore is 6.      Cranial Nerves: Cranial nerves 2-12 are intact. No cranial nerve deficit.      Sensory: Sensation is intact. No sensory deficit.      Motor: Motor function is intact. No weakness.      Coordination: Coordination is intact. Coordination normal.      Gait: Gait is intact.      Deep Tendon Reflexes: Reflexes normal.   Psychiatric:         Attention and Perception: Attention and perception normal.         Mood and Affect: Mood and affect normal.         Speech: Speech normal.         Behavior: Behavior normal.         Thought Content: Thought content normal.         Judgment: Judgment normal.          Labs Reviewed   LACTATE - Normal       Result Value    Lactate 1.3      Narrative:     Venipuncture immediately after or during the administration of  Metamizole may lead to falsely low results. Testing should be performed immediately  prior to Metamizole dosing.   TROPONIN I, HIGH SENSITIVITY - Normal    Troponin I, High Sensitivity 4      Narrative:     Less than 99th percentile of normal range cutoff-  Female and children under 18 years old <14 ng/L; Male <21 ng/L: Negative  Repeat testing should be performed if clinically indicated.     Female and children under 18 years old 14-50 ng/L; Male 21-50 ng/L:  Consistent with possible cardiac damage and possible increased clinical   risk. Serial measurements may help to assess extent of myocardial damage.     >50 ng/L: Consistent with cardiac damage, increased clinical risk and  myocardial infarction. Serial measurements may help assess extent of   myocardial damage.      NOTE: Children less than 1 year old may have higher baseline troponin   levels and results should be interpreted in conjunction with the overall   clinical context.     NOTE: Troponin I testing is performed using a different   testing methodology at [facility] than at other   [facility]. Direct result comparisons should only   be made within the same method.   B-TYPE NATRIURETIC PEPTIDE - Normal    BNP 82      Narrative:        <100 pg/mL - Heart failure unlikely  100-299 pg/mL - Intermediate probability of acute heart                  failure exacerbation. Correlate with clinical                  context and patient history.    >=300 pg/mL - Heart Failure likely. Correlate with clinical                  context and patient history.    BNP testing is performed using different testing methodology at [facility] than at other [facility]. Direct result comparisons should only be made within the same method.           No orders to display        Critical Care    Performed by: Steven Garcia DO  Authorized by: Steven Garcia DO    Critical care provider statement:     Critical care time (minutes):  30     Critical care time was exclusive of:  Separately billable procedures and treating other patients    Critical care was necessary to treat or prevent imminent or life-threatening deterioration of the following conditions: Pulmonary embolism.    Critical care was time spent personally by me on the following activities:  Development of treatment plan with patient or surrogate, discussions with consultants, discussions with primary provider, examination of patient, obtaining history from patient or surrogate, ordering and performing treatments and interventions, ordering and review of laboratory studies, ordering and review of radiographic studies, pulse oximetry, re-evaluation of patient's condition and review of old charts       Medical Decision Making  CTA of the chest was performed on 9/3/2024 at 1606 read acute occlusive pulmonary embolism involving segmental and subsegmental posterior medial right lower lobe pulmonary arteries.  Mildly elevated RV/LV ratio at 1.2 to.  This does indicate an element of right heart strain.  Thoracic spine scoliosis and DJD as described.  After evaluating the patient I did have a discussion with him concerning his abnormal CTA results and told him that I would have to get a PERT consult because of the right heart strain and they would determine the next step in his care.  0773 I talked to Dr. Ayesha Hartman MD -of the PERT team and he wanted me to order lactic acid, troponin and BNP.  I explained to them that the patient is currently asymptomatic with respect to shortness of breath or chest pain and is already on Eliquis.  9522 I talked to Dr. Ayesha Hartman MD -the PERT team and the recommendation was to continue the patient with anticoagulation.  The told me that the if there was anything abnormal with the lab work I ordered to contact the cardiologist on-call from my facility.  The patient's troponin was normal.  Lactic acid was normal and BNP was normal.  I had a  discussion with the patient concerning the recommendation of the PERT team and the patient was discharged home in stable satisfactory condition and was encouraged to continue taking the Eliquis as prescribed.  Patient was also encouraged to get an outpatient echocardiogram performed as soon as possible.           Diagnoses as of 09/04/24 1031   Acute pulmonary embolism without acute cor pulmonale, unspecified pulmonary embolism type (Multi)   Acute deep vein thrombosis (DVT) of proximal vein of right lower extremity (Multi)                    Steven Garcia,   09/04/24 1031       Steven Garcia,   09/27/24 0715

## 2024-09-05 ENCOUNTER — OFFICE VISIT (OUTPATIENT)
Dept: CARDIOLOGY | Facility: CLINIC | Age: 64
End: 2024-09-05
Payer: COMMERCIAL

## 2024-09-05 VITALS
HEIGHT: 71 IN | WEIGHT: 282.4 LBS | HEART RATE: 83 BPM | OXYGEN SATURATION: 99 % | SYSTOLIC BLOOD PRESSURE: 124 MMHG | BODY MASS INDEX: 39.53 KG/M2 | DIASTOLIC BLOOD PRESSURE: 86 MMHG

## 2024-09-05 DIAGNOSIS — I26.99 OTHER ACUTE PULMONARY EMBOLISM WITHOUT ACUTE COR PULMONALE (MULTI): ICD-10-CM

## 2024-09-05 DIAGNOSIS — I82.461 ACUTE DEEP VEIN THROMBOSIS (DVT) OF CALF MUSCLE VEIN OF RIGHT LOWER EXTREMITY (MULTI): Primary | ICD-10-CM

## 2024-09-05 PROCEDURE — 1159F MED LIST DOCD IN RCRD: CPT

## 2024-09-05 PROCEDURE — 3079F DIAST BP 80-89 MM HG: CPT

## 2024-09-05 PROCEDURE — 93000 ELECTROCARDIOGRAM COMPLETE: CPT

## 2024-09-05 PROCEDURE — 1036F TOBACCO NON-USER: CPT

## 2024-09-05 PROCEDURE — 3074F SYST BP LT 130 MM HG: CPT

## 2024-09-05 PROCEDURE — 3008F BODY MASS INDEX DOCD: CPT

## 2024-09-05 PROCEDURE — 99204 OFFICE O/P NEW MOD 45 MIN: CPT

## 2024-09-05 NOTE — PROGRESS NOTES
[facility]  Cardiology Clinic Visit Note    This is a 64 y.o. male former smoker with a history of hypertension and hyperlipidemia who presents to the clinic referred by [facility] ED for deep vein thrombosis and pulmonary embolism.     On 8/30/2024 patient was at his hematology oncology appointment to discuss elevated ferritin where he had complaints of right lower leg calf pain started approximately 6 weeks ago.  He also complained of mild shortness of breath a few weeks ago that has since resolved.  He underwent a venous duplex study on 9/3/2024 which revealed an acute occlusive deep vein thrombosis in the right gastrocnemius vein and age-indeterminate DVT in the popliteal vein.  He was started on Eliquis 10 mg twice daily for 7 days with maintenance dose to follow and a stat CTA of the chest was ordered to rule out acute pulmonary embolism. CTA of the chest PE protocol on 9/3/2024 revealed acute occlusive pulmonary embolism involving the segmental and subsegmental posteromedial right lower lobe pulmonary arteries.  The ED physician consulted virtually with a pulmonology resident with the pulmonary embolism response team but no intervention such as thrombectomy is required.  Troponin negative, serum lactate was not elevated and BNP was also not elevated, EKG was not performed, he was not tachycardic and was not hypoxic with no complaint of shortness of breath so the patient was discharged home with follow-up in cardiology and an outpatient echocardiogram was scheduled.    Devon today denies chest pain, shortness of breath, palpitations, fever, chills, orthopnea, paroxysmal nocturnal dyspnea or syncope and complains of minimal sharp pain behind his right knee.  He does have some nonpitting edema in his lower extremities that is equal bilaterally.    Active Issues  Right lower extremity deep vein thrombosis, unprovoked  Pulmonary embolism  -Venous duplex study on 9/3/2024 which revealed an acute  occlusive deep vein thrombosis in the right gastrocnemius vein and age-indeterminate DVT in the right popliteal vein  -CTA of the chest PE protocol on 9/3/2024 revealed acute occlusive pulmonary embolism involving the segmental and subsegmental posteromedial right lower lobe pulmonary arteries.  -He was already started on Eliquis 10 g twice daily for 7 days with transition to Eliquis 5 mg twice daily indefinitely by Alpa James CNP with hematology oncology  -Heme-onc plans to do a hypercoagulable workup at his next visit.  -EKG in the office today shows normal sinus rhythm    Past Medical History  Past Medical History:   Diagnosis Date    Bleb, lung (Multi) 06/28/2023    Encounter for examination of eyes and vision without abnormal findings     Diabetic eye exam    Encounter for screening for malignant neoplasm of prostate 06/30/2021    Screening PSA (prostate specific antigen)    Rib fracture 06/28/2023       Past Surgical History  Past Surgical History:   Procedure Laterality Date    OTHER SURGICAL HISTORY  10/12/2020    [place] tooth extraction    OTHER SURGICAL HISTORY  10/12/2020    Lung lobectomy    OTHER SURGICAL HISTORY  10/12/2020    Testicular torsion repair    OTHER SURGICAL HISTORY  07/28/2021    Colonoscopy       Medications  Current Outpatient Medications   Medication Instructions    apixaban (Eliquis) 5 mg (74 tabs) tablet Take 2 tablets (10 mg) by mouth 2 times a day for 7 days, then take 1 tablet (5 mg) by mouth 2 times a day.    dorzolamide (Trusopt) 2 % ophthalmic solution ophthalmic (eye), 2 times daily    fluticasone (Flonase) 50 mcg/actuation nasal spray 1 spray, Each Nostril, Daily PRN, Shake gently. Before first use, prime pump. After use, clean tip and replace cap.    furosemide (LASIX) 20 mg, oral, 2 times daily    hydroCHLOROthiazide (HYDRODIURIL) 50 mg, oral, Daily    latanoprost (Xalatan) 0.005 % ophthalmic solution ophthalmic (eye)    levothyroxine (SYNTHROID, LEVOXYL) 25 mcg, oral,  Daily    levothyroxine (SYNTHROID, LEVOXYL) 200 mcg, oral, Daily    losartan (COZAAR) 50 mg, oral, 2 times daily    MAGNESIUM CARBONATE ORAL oral    multivitamin tablet 1 tablet, oral, Daily    mupirocin (Bactroban) 2 % cream APPLY TO THE AFFECTED AREA(S) THREE TIMES DAILY       Allergies  Allergies   Allergen Reactions    Itraconazole Hives       Social History  Social History     Tobacco Use    Smoking status: Former     Types: Cigarettes    Smokeless tobacco: Never   Vaping Use    Vaping status: Never Used   Substance Use Topics    Alcohol use: Yes     Comment: SOCIALLY    Drug use: Never       Family History  Family History   Problem Relation Name Age of Onset    Hypertension Mother      Diabetes Mother      Thyroid disease Mother      Hypertension Father      Diabetes Father         VITALS  Vitals:    09/05/24 0947   BP: 124/86   Pulse: 83   SpO2: 99%       Weight  Vitals:    09/05/24 0947   Weight: 128 kg (282 lb 6.4 oz)       PHYSICAL EXAM  Physical Exam  Vitals and nursing note reviewed.   Constitutional:       General: He is not in acute distress.  HENT:      Head: Normocephalic and atraumatic.      Mouth/Throat:      Mouth: Mucous membranes are moist.      Pharynx: Oropharynx is clear.   Eyes:      General: No scleral icterus.     Pupils: Pupils are equal, round, and reactive to light.   Cardiovascular:      Rate and Rhythm: Normal rate and regular rhythm.      Pulses: Normal pulses.      Heart sounds: Normal heart sounds, S1 normal and S2 normal. No murmur heard.     No friction rub.   Pulmonary:      Effort: Pulmonary effort is normal.      Breath sounds: Normal breath sounds.   Abdominal:      General: Bowel sounds are normal. There is no distension.      Palpations: Abdomen is soft.      Tenderness: There is no abdominal tenderness.   Musculoskeletal:         General: No swelling. Normal range of motion.      Cervical back: Normal range of motion and neck supple.      Right lower leg: Edema present.       Left lower leg: Edema present.   Skin:     General: Skin is warm and dry.      Capillary Refill: Capillary refill takes less than 2 seconds.      Findings: No rash.   Neurological:      General: No focal deficit present.      Mental Status: He is alert.   Psychiatric:         Mood and Affect: Mood normal.         Behavior: Behavior normal.         Cardiovascular Labs  Lab Results   Component Value Date    HGB 15.6 08/28/2024    HGB 15.8 06/01/2024    HGB 15.6 03/25/2024     08/28/2024    WBC 5.3 08/28/2024     08/28/2024    K 3.8 08/28/2024    CREATININE 1.13 08/28/2024    CREATININE 0.92 06/01/2024    CREATININE 1.26 03/25/2024    BUN 22 08/28/2024    CALCIUM 9.1 08/28/2024    INR 0.9 09/03/2024    BNP 82 09/04/2024    TROPHS 4 09/04/2024    LDLF 182 (H) 02/24/2023       Assessment and Plan  The radiologist read on the CT scan noted a mildly elevated RV/LV ratio at 1.2 to stating it could indicate an element of right heart strain but due to the tiny size of the pulmonary embolisms it is extremely unlikely for these small embolisms to cause RV strain. It is more likely the RV is enlarged due to hypertensive disease or highly likely it is obstructive sleep apnea. Troponin negative x 1 and patient was completely asymptomatic with no complaints of shortness of breath, was not tachycardic and was not hypoxic.  I agree with Aly I recommend he continues it indefinitely as this appears to be an unprovoked DVT. I agree a 2D transthoracic echocardiogram should be performed. His leg edema is equal has been a chronic issue for him so I suspect the differential for that is more lymphedema and less likely heart failure but we will still appreciate his echocardiogram.  I recommend he follow-up with his PCP to possibly undergo a sleep study to evaluate for presence of obstructive sleep apnea.     Return to Care:  3 months in the vascular clinic.    If your symptoms worsen or progress please go directory to your  [facility] emergency department for evaluation.     Thank you for this interesting clinical case and allowing me to participate in the care of this patient. Please reach me out if you have any questions or if you need any clarifications regarding this patient's care.    **Disclaimer: This note was dictated by speech recognition, and every effort has been made to prevent any error in transcription, however minor errors may be present**  ___________________________________________________  Edgar Nation, MSN, CNP, ACNPC, CCRN  Advanced Practice Provider, Nurse Practitioner  Division of Cardiovascular Medicine  [facility]  [facility]

## 2024-09-06 LAB
AT III AG ACT/NOR PPP IA: 79 % (ref 82–136)
PROT C AG ACT/NOR PPP IA: 93 % (ref 63–153)

## 2024-09-10 ENCOUNTER — APPOINTMENT (OUTPATIENT)
Dept: PRIMARY CARE | Facility: CLINIC | Age: 64
End: 2024-09-10
Payer: COMMERCIAL

## 2024-09-10 VITALS
HEART RATE: 68 BPM | DIASTOLIC BLOOD PRESSURE: 86 MMHG | SYSTOLIC BLOOD PRESSURE: 132 MMHG | HEIGHT: 71 IN | BODY MASS INDEX: 40.18 KG/M2 | WEIGHT: 287 LBS

## 2024-09-10 DIAGNOSIS — I82.461 ACUTE DEEP VEIN THROMBOSIS (DVT) OF CALF MUSCLE VEIN OF RIGHT LOWER EXTREMITY (MULTI): ICD-10-CM

## 2024-09-10 DIAGNOSIS — E66.01 CLASS 3 SEVERE OBESITY DUE TO EXCESS CALORIES WITH SERIOUS COMORBIDITY AND BODY MASS INDEX (BMI) OF 40.0 TO 44.9 IN ADULT (MULTI): ICD-10-CM

## 2024-09-10 DIAGNOSIS — I10 PRIMARY HYPERTENSION: ICD-10-CM

## 2024-09-10 DIAGNOSIS — R60.0 LOWER EXTREMITY EDEMA: ICD-10-CM

## 2024-09-10 DIAGNOSIS — I82.4Y1 ACUTE DEEP VEIN THROMBOSIS (DVT) OF PROXIMAL VEIN OF RIGHT LOWER EXTREMITY (MULTI): ICD-10-CM

## 2024-09-10 DIAGNOSIS — I26.99 ACUTE PULMONARY EMBOLISM WITHOUT ACUTE COR PULMONALE, UNSPECIFIED PULMONARY EMBOLISM TYPE (MULTI): Primary | ICD-10-CM

## 2024-09-10 PROCEDURE — 1036F TOBACCO NON-USER: CPT | Performed by: PHYSICIAN ASSISTANT

## 2024-09-10 PROCEDURE — 1159F MED LIST DOCD IN RCRD: CPT | Performed by: PHYSICIAN ASSISTANT

## 2024-09-10 PROCEDURE — 99214 OFFICE O/P EST MOD 30 MIN: CPT | Performed by: PHYSICIAN ASSISTANT

## 2024-09-10 PROCEDURE — 3075F SYST BP GE 130 - 139MM HG: CPT | Performed by: PHYSICIAN ASSISTANT

## 2024-09-10 PROCEDURE — 1160F RVW MEDS BY RX/DR IN RCRD: CPT | Performed by: PHYSICIAN ASSISTANT

## 2024-09-10 PROCEDURE — 3008F BODY MASS INDEX DOCD: CPT | Performed by: PHYSICIAN ASSISTANT

## 2024-09-10 PROCEDURE — 3079F DIAST BP 80-89 MM HG: CPT | Performed by: PHYSICIAN ASSISTANT

## 2024-09-10 ASSESSMENT — ENCOUNTER SYMPTOMS
FREQUENCY: 0
WHEEZING: 0
VOMITING: 0
PALPITATIONS: 0
BACK PAIN: 0
CONSTIPATION: 0
WOUND: 0
NECK PAIN: 0
SHORTNESS OF BREATH: 0
TREMORS: 0
NAUSEA: 0
COUGH: 0
BRUISES/BLEEDS EASILY: 0
EYE REDNESS: 0
HEADACHES: 0
FATIGUE: 0
CHILLS: 0
SINUS PAIN: 0
SLEEP DISTURBANCE: 0
NUMBNESS: 0
FLANK PAIN: 0
ABDOMINAL PAIN: 0
CHEST TIGHTNESS: 0
FEVER: 0
CONFUSION: 0
EYE DISCHARGE: 0
SORE THROAT: 0
RHINORRHEA: 0
DIARRHEA: 0
DIZZINESS: 0

## 2024-09-10 ASSESSMENT — PATIENT HEALTH QUESTIONNAIRE - PHQ9
1. LITTLE INTEREST OR PLEASURE IN DOING THINGS: NOT AT ALL
SUM OF ALL RESPONSES TO PHQ9 QUESTIONS 1 AND 2: 0
2. FEELING DOWN, DEPRESSED OR HOPELESS: NOT AT ALL

## 2024-09-10 NOTE — PROGRESS NOTES
Subjective   Patient ID: Devon Medina is a 64 y.o. male who presents for ER Follow-up (F/U ER VISIT 9/4/24 - DVT/PE. CONTINUES TO HAVE PAIN BEHIND RIGHT KNEE/LEG. )    HPI    ER follow up 9/4/24 for DVT and PE  Cont to note pain behind R knee /leg   Started on eliquis and has transitioned from the 2 tab po bid x 1 to now the 1 tab po bid   He has a visit set later this month with cardio and with kenneth Mcdaniel filled the medicine so I assume they will do RF but I explained if not, I am happy to call and manage the RF  He mentioned cardio suspected it was caused by the varicosities which I explained if so likely would be recommended to be on for life  Consider further blood clotting testing with kenneth   Suspected DAVID but never had tested done - discussed this can be risk for DVT as well but declines testing at this time   Will aim to cont for at least 3-6 mo but suggest consider even longer duration - life given his risks     Obesity   Discussed injectables and pt to check with insurance   Diet and ex   Consider dietician or bariatric referral       Med check   hypothyroid -taking meds -225 mcg daily  HTN - home readings are typically 110-130s/70-80  mag - cont on supplement   glaucoma - on drop and following with dr colin fu- R - stable- following with ortho/pod on occasion - pt feels it is getting worse - leg /foot cramping and discomfort . He denies following with ortho or pod in sometime and states he is dealing with so much pain he is needing to take ibuprofen 600-800 at bedtime to help him get out of bed the next morning   elevated ferritin - additional labs done and he was referred to kenneth who he is following with at this time.   LE edema /lymphedmea /chronic venous insuff - following with lymphedema clinic in [place] and has moses hose and overall back to good baseline - not on lasix/K at this time - discussed water therapy but didn't want to do in [place] - is worried about the cost     Preventative  testing   PSA - oct 2023   colonoscopy - oct 2022 -polyps - repeat in 3 years   Depression Screen - PHQ2 NEG June 2023  Fall -May 2024- tripped over landscaping and laceration L knee      flu shot given in office fall 2023     Patient Active Problem List   Diagnosis    Elevated ferritin    Glaucoma    Glucose intolerance (impaired glucose tolerance)    History of colon polyps    Hypercholesterolemia    Hypertension    Hypothyroidism    Irregular heart rate    Left knee pain    Nzbf-Oliji-Dptrlgd disease (HHS-HCC)    Low back pain    Low serum vitamin B12    Lower extremity edema    Muscle cramps    Class 2 severe obesity due to excess calories with serious comorbidity and body mass index (BMI) of 38.0 to 38.9 in adult (Multi)    Rash    Right shoulder pain    Primary open-angle glaucoma, bilateral, mild stage    Acute pulmonary embolism without acute cor pulmonale (Multi)    Acute deep vein thrombosis (DVT) of calf muscle vein of right lower extremity (Multi)       Review of Systems   Constitutional:  Negative for chills, fatigue and fever.   HENT:  Negative for congestion, rhinorrhea, sinus pain, sore throat and tinnitus.    Eyes:  Negative for discharge, redness and visual disturbance.   Respiratory:  Negative for cough, chest tightness, shortness of breath and wheezing.    Cardiovascular:  Positive for leg swelling. Negative for chest pain and palpitations.   Gastrointestinal:  Negative for abdominal pain, constipation, diarrhea, nausea and vomiting.   Endocrine: Negative for cold intolerance and heat intolerance.   Genitourinary:  Negative for flank pain, frequency and urgency.   Musculoskeletal:  Negative for back pain, gait problem and neck pain.   Skin:  Negative for rash and wound.   Neurological:  Negative for dizziness, tremors, syncope, numbness and headaches.   Hematological:  Does not bruise/bleed easily.   Psychiatric/Behavioral:  Negative for confusion, sleep disturbance and suicidal ideas.         Past Medical History:   Diagnosis Date    Bleb, lung (Multi) 06/28/2023    Encounter for examination of eyes and vision without abnormal findings     Diabetic eye exam    Encounter for screening for malignant neoplasm of prostate 06/30/2021    Screening PSA (prostate specific antigen)    Rib fracture 06/28/2023       Past Surgical History:   Procedure Laterality Date    OTHER SURGICAL HISTORY  10/12/2020    [place] tooth extraction    OTHER SURGICAL HISTORY  10/12/2020    Lung lobectomy    OTHER SURGICAL HISTORY  10/12/2020    Testicular torsion repair    OTHER SURGICAL HISTORY  07/28/2021    Colonoscopy       Family History   Problem Relation Name Age of Onset    Hypertension Mother      Diabetes Mother      Thyroid disease Mother      Hypertension Father      Diabetes Father         Social History     Tobacco Use    Smoking status: Former     Types: Cigarettes    Smokeless tobacco: Never   Vaping Use    Vaping status: Never Used   Substance Use Topics    Alcohol use: Yes     Comment: SOCIALLY    Drug use: Never       Allergies   Allergen Reactions    Itraconazole Hives       Current Outpatient Medications   Medication Sig Dispense Refill    apixaban (Eliquis) 5 mg (74 tabs) tablet Take 2 tablets (10 mg) by mouth 2 times a day for 7 days, then take 1 tablet (5 mg) by mouth 2 times a day. 74 tablet 0    dorzolamide (Trusopt) 2 % ophthalmic solution Administer into affected eye(s) twice a day.      hydroCHLOROthiazide (HYDRODiuril) 50 mg tablet TAKE 1 TABLET BY MOUTH DAILY 90 tablet 3    latanoprost (Xalatan) 0.005 % ophthalmic solution Administer into affected eye(s).      levothyroxine (Synthroid, Levoxyl) 200 mcg tablet Take 1 tablet (200 mcg) by mouth once daily. 30 tablet 11    levothyroxine (Synthroid, Levoxyl) 25 mcg tablet Take 1 tablet (25 mcg) by mouth once daily. 90 tablet 3    losartan (Cozaar) 50 mg tablet Take 1 tablet (50 mg) by mouth 2 times a day. 60 tablet 5    MAGNESIUM CARBONATE ORAL Take 250  "mg by mouth once daily.      multivitamin tablet Take 1 tablet by mouth once daily.      fluticasone (Flonase) 50 mcg/actuation nasal spray Administer 1 spray into each nostril once daily as needed for rhinitis or allergies. Shake gently. Before first use, prime pump. After use, clean tip and replace cap. (Patient not taking: Reported on 4/1/2024) 16 g 5    furosemide (Lasix) 20 mg tablet Take 1 tablet (20 mg) by mouth 2 times a day.      mupirocin (Bactroban) 2 % cream APPLY TO THE AFFECTED AREA(S) THREE TIMES DAILY       No current facility-administered medications for this visit.       Objective   /86   Pulse 68   Ht 1.803 m (5' 11\")   Wt 130 kg (287 lb)   BMI 40.03 kg/m²     Physical Exam  Vitals reviewed.   Constitutional:       Appearance: Normal appearance. He is obese.   HENT:      Head: Normocephalic.      Right Ear: External ear normal.      Left Ear: External ear normal.      Nose: Nose normal. No congestion or rhinorrhea.      Mouth/Throat:      Mouth: Mucous membranes are moist.   Eyes:      Extraocular Movements: Extraocular movements intact.      Conjunctiva/sclera: Conjunctivae normal.      Pupils: Pupils are equal, round, and reactive to light.   Cardiovascular:      Rate and Rhythm: Normal rate and regular rhythm.      Pulses: Normal pulses.      Comments: Bilat LE edema - lymphedema   Varicosities noted bilat  Wearing moses hose   Pulmonary:      Effort: Pulmonary effort is normal.      Breath sounds: Normal breath sounds.   Abdominal:      General: Bowel sounds are normal.      Palpations: Abdomen is soft.      Tenderness: There is no abdominal tenderness. There is no right CVA tenderness or left CVA tenderness.   Musculoskeletal:         General: Tenderness present. Normal range of motion.      Cervical back: Normal range of motion and neck supple. No tenderness.   Skin:     General: Skin is warm and dry.   Neurological:      General: No focal deficit present.      Mental Status: He is " alert and oriented to person, place, and time.   Psychiatric:         Mood and Affect: Mood normal.         Behavior: Behavior normal.     Testing   Reviewed ER notes  Reviewed doppler and CT         Impression    MDM    1) COMPLEXITY: 1 UNDIAGNOSED NEW PROBLEM WITH UNCERTAIN PROGNOSIS  2)DATA: TESTS INTERPRETED AND OR ORDERED, TOOK INDEPENDENT HISTORY OR RECORDS REVIEWED  3)RISK: MODERATE RISK DUE TO NATURE OF MEDICAL CONDITIONS/COMORBIDITY OR MEDICATIONS ORDERED OR SURGICAL OR PROCEDURE REFERRAL, .     Reviewed labs and Testing on file   Patient to follow diet low in cholesterol, fat, and sodium.    Patient is advised to increase Exercise.  Patient is recommended to lose weight.  Reviewed Meds and discussed common side effects  Continue as directed   Patient is strongly advised to be compliant with recommendations.    Return to Clinic sooner if needed.  Patient denies further questions/concerns at this time     Assessment/Plan   Problem List Items Addressed This Visit             ICD-10-CM    Hypertension I10    Lower extremity edema R60.0    Class 3 severe obesity due to excess calories with serious comorbidity and body mass index (BMI) of 40.0 to 44.9 in adult (Multi) E66.01, Z68.41    Acute pulmonary embolism without acute cor pulmonale (Multi) - Primary I26.99    Acute deep vein thrombosis (DVT) of calf muscle vein of right lower extremity (Multi) I82.461     Other Visit Diagnoses         Codes    Acute deep vein thrombosis (DVT) of proximal vein of right lower extremity (Multi)     I82.4Y1             Fu as before in [place] with labs and med check / medicare

## 2024-09-24 ENCOUNTER — HOSPITAL ENCOUNTER (OUTPATIENT)
Dept: CARDIOLOGY | Facility: HOSPITAL | Age: 64
Discharge: HOME | End: 2024-09-24
Payer: COMMERCIAL

## 2024-09-24 DIAGNOSIS — I26.99 ACUTE PULMONARY EMBOLISM, UNSPECIFIED PULMONARY EMBOLISM TYPE, UNSPECIFIED WHETHER ACUTE COR PULMONALE PRESENT (MULTI): ICD-10-CM

## 2024-09-24 DIAGNOSIS — I27.82 CHRONIC PULMONARY EMBOLISM (MULTI): ICD-10-CM

## 2024-09-24 LAB
AORTIC VALVE MEAN GRADIENT: 3 MMHG
AORTIC VALVE PEAK VELOCITY: 1.21 M/S
AV PEAK GRADIENT: 5.9 MMHG
AVA (PEAK VEL): 3.33 CM2
AVA (VTI): 3.17 CM2
EJECTION FRACTION APICAL 4 CHAMBER: 51.5
EJECTION FRACTION: 58 %
LEFT ATRIUM VOLUME AREA LENGTH INDEX BSA: 14.3 ML/M2
LEFT VENTRICLE INTERNAL DIMENSION DIASTOLE: 4.98 CM (ref 3.5–6)
LEFT VENTRICULAR OUTFLOW TRACT DIAMETER: 2.2 CM
LV EJECTION FRACTION BIPLANE: 54 %
MITRAL VALVE E/A RATIO: 1.13
RIGHT VENTRICLE FREE WALL PEAK S': 14 CM/S
TRICUSPID ANNULAR PLANE SYSTOLIC EXCURSION: 2.2 CM

## 2024-09-24 PROCEDURE — 93306 TTE W/DOPPLER COMPLETE: CPT | Performed by: STUDENT IN AN ORGANIZED HEALTH CARE EDUCATION/TRAINING PROGRAM

## 2024-09-24 PROCEDURE — 2500000004 HC RX 250 GENERAL PHARMACY W/ HCPCS (ALT 636 FOR OP/ED): Performed by: STUDENT IN AN ORGANIZED HEALTH CARE EDUCATION/TRAINING PROGRAM

## 2024-09-24 PROCEDURE — 93306 TTE W/DOPPLER COMPLETE: CPT

## 2024-09-27 ENCOUNTER — OFFICE VISIT (OUTPATIENT)
Dept: HEMATOLOGY/ONCOLOGY | Facility: CLINIC | Age: 64
End: 2024-09-27
Payer: COMMERCIAL

## 2024-09-27 VITALS
TEMPERATURE: 97.2 F | DIASTOLIC BLOOD PRESSURE: 79 MMHG | OXYGEN SATURATION: 97 % | BODY MASS INDEX: 39.61 KG/M2 | SYSTOLIC BLOOD PRESSURE: 117 MMHG | WEIGHT: 284 LBS | RESPIRATION RATE: 20 BRPM | HEART RATE: 72 BPM

## 2024-09-27 DIAGNOSIS — I26.99 ACUTE PULMONARY EMBOLISM, UNSPECIFIED PULMONARY EMBOLISM TYPE, UNSPECIFIED WHETHER ACUTE COR PULMONALE PRESENT (MULTI): ICD-10-CM

## 2024-09-27 DIAGNOSIS — I82.4Z9 DEEP VEIN THROMBOSIS (DVT) OF DISTAL VEIN OF LOWER EXTREMITY, UNSPECIFIED CHRONICITY, UNSPECIFIED LATERALITY (MULTI): Primary | ICD-10-CM

## 2024-09-27 DIAGNOSIS — D50.8 OTHER IRON DEFICIENCY ANEMIA: ICD-10-CM

## 2024-09-27 PROCEDURE — 1126F AMNT PAIN NOTED NONE PRSNT: CPT

## 2024-09-27 PROCEDURE — 3078F DIAST BP <80 MM HG: CPT

## 2024-09-27 PROCEDURE — 1159F MED LIST DOCD IN RCRD: CPT

## 2024-09-27 PROCEDURE — 99213 OFFICE O/P EST LOW 20 MIN: CPT

## 2024-09-27 PROCEDURE — 3074F SYST BP LT 130 MM HG: CPT

## 2024-09-27 ASSESSMENT — PAIN SCALES - GENERAL: PAINLEVEL: 0-NO PAIN

## 2024-09-27 NOTE — PROGRESS NOTES
Instructed to get labs at the hosp 12/23  Rtc 12/27 9am CNP  Reviewed AVS with patient- patient verbalizes understanding

## 2024-09-27 NOTE — PATIENT INSTRUCTIONS
Continue on Eliquis 5mg 1 tablet twice daily (refill submitted to [facility])  RTC in 2-3 months with labs prior     (CBC w/diff, CMP, Ferritin, Iron Panel)

## 2024-09-27 NOTE — PROGRESS NOTES
Patient ID: Rosalba Ba is a 64 y.o. male.    Subjective    HPI  Patient ID: Rosalba Ba is a 64 y.o. male.        Subjective  HPI     Patient Visit Information:   Visit Type: Follow Up Visit      History of Present Illness:      ID Statement:    ROSALBA BA is a 63 year old Male        Chief Complaint: Evaluation for elevated ferritin   Interval History:    Referred by Alpa Saini PA-C      Reason for referral: elevated ferritin      HPI   62 year old man with hx of hypothyroidism, obesity, HTN, HL, leg-Calve-Perthes disease  (idiopathic AVN of hip) who presents for evaluation of elevated ferritin. His labs showed elevated ferritin 503-554 since Oct 2021, Tsat 31-43%.  He is feeling ok in general   he had no recent infections or fever   walks usually with his cane, limited mobility secondary to his hip problems   he does not have a lot of pain though   eats well, no nausea or vomiting   no bleeding, no hematuria, hematochezia or melena    energy is fair   he is not very active though      interval history- 9/27/24     Recently burned right ring finger by emptying hot coals out of the grill    Energy is decent  Remains walking with a cane  No bleeding or bruising issues     Chronic lower back and right hip pain, remains on 800 mg ibuprofen, helps manage the pain     No numbness tingling hands or feet   continues to wear compression stocking for edema   Returned to walking on the treadmill, 1-2 miles     Appetite is good, eating well trying to healthier  No nausea or vomiting   drinking water and lots of coffee    no new falls, no new hospital admissions  No GI issues   No urinary issues   No hematuria, hematochezia or melena   no fevers, chills or infections  No SOB  no chest pain or pressure   No abdominal pain or cramping   Restarted having some beers on the weekends    Objective    BSA: There is no height or weight on file to calculate BSA.  There were no vitals taken for this visit.     Physical  Exam  Vitals and nursing note reviewed.   Constitutional:       Appearance: Normal appearance. He is obese.   HENT:      Head: Normocephalic and atraumatic.      Nose: Nose normal.      Mouth/Throat:      Mouth: Mucous membranes are moist.      Pharynx: Oropharynx is clear.   Eyes:      General: No scleral icterus.     Extraocular Movements: Extraocular movements intact.      Conjunctiva/sclera: Conjunctivae normal.   Cardiovascular:      Rate and Rhythm: Normal rate and regular rhythm.      Pulses: Normal pulses.      Heart sounds: Normal heart sounds.   Pulmonary:      Effort: Pulmonary effort is normal.      Breath sounds: Normal breath sounds.   Abdominal:      Palpations: Abdomen is soft.      Tenderness: There is no abdominal tenderness.   Musculoskeletal:         General: Swelling present. Normal range of motion.      Cervical back: Normal range of motion.   Skin:     General: Skin is warm and dry.   Neurological:      General: No focal deficit present.      Mental Status: He is alert and oriented to person, place, and time.   Psychiatric:         Mood and Affect: Mood normal.         Behavior: Behavior normal.         Thought Content: Thought content normal.         Judgment: Judgment normal.         Performance Status:  Symptomatic; fully ambulatory      Assessment/Plan      1. 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 States feels like a bad omar horse in his calf. He denies any known trauma to the leg, no recent log distance travel. Stopped walking on the treadmill, due to being so busy around the house and from the leg pain. Mild SOB a few weeks ago, that has resolved. Denies any chest pain or pressure. Appetite is good, he is trying to stay hydrated by drinking plenty of water. Chronic lower back pain, and right hip pain unchanged, manages with OTC ibuprofen, as needed. Admits to consuming alcohol again mainly on the weekends, to cope with stress. Emphasized the importance of abstaining from consuming alcohol.      Ferritin remains elevated but stable at 496. Discussed that we continue to hold of on proceeding with phlebotomies at this time. Will recheck labs in 4-5 months and consider phlebotomy if Ferritin remains >500.       Discussed ordering a right venous doppler to rule out a blood clot, pt is agreeable, will schedule accordingly. Educated patient on signs and symptoms that warrant urgent care.        2. DVT/PE:  9/3/24: Notified regarding Doppler results- positive for acute occlusive DVT in the gastrocnemius veins in the calf, also reported an age indeterminate DVT visualized in the popliteal vein and chronic changes visualized in the proximal femoral, mid femoral and distal femoral veins.    - CT reviewed- acute occlusive pulmonary embolism involving segmental and subsegmental posteromedial right lower lobe pulmonary arteries. Mildly elevated RV/LV ratio 1.22, indicating an element of right heart strain  - Eliquis 5mg (2 tablets (10 mg) by mouth 2 times      9/27/24  - patient is in office for follow-up since starting Eliquis  - denies any abnormal bleeding/bruising  - denies any new clotting events or concerns  - remains to have cramping pain in his right LLE  - discussed continuation of Eliquis indefinitely at this time  - will continue to monitor patient for bleeding risks        Plan:   RTC in 2 months with labs prior        (CBC w/diff, CMP, Ferritin, Iron Panel)         Alpa James, APRN-CNP

## 2024-12-03 NOTE — PROGRESS NOTES
CHIEF COMPLAINT  Follow up     HISTORY OF PRESENT ILLNESS    Cardiovascular hx:    Hx of PE/DVT:  -Hx of DVT in the gastrocnemius veins of the RLE; age indeterminate DVT in the right popliteal vein; chronic changes in the prox femoral, mid femoral, and distal femoral veins (September, 2024)  -Appears unprovoked  -Follows with hematology with hypercoag testing to be done prior to next visit   -Currently on Eliquis 5mg twice daily indefinitely         Cardiovascular testing:  Echo (September, 2024)-normal biventricular function         Past Medical, Surgical, and Family History reviewed and updated in chart.     Reviewed all medications by prescribing practitioner or clinical pharmacist (such as prescriptions, OTCs, herbal therapies and supplements) and documented in the medical record.    Past Medical History  Past Medical History:   Diagnosis Date    Bleb, lung (Multi) 06/28/2023    Encounter for examination of eyes and vision without abnormal findings     Diabetic eye exam    Encounter for screening for malignant neoplasm of prostate 06/30/2021    Screening PSA (prostate specific antigen)    Rib fracture 06/28/2023       Social History  Social History     Tobacco Use    Smoking status: Former     Types: Cigarettes    Smokeless tobacco: Never   Vaping Use    Vaping status: Never Used   Substance Use Topics    Alcohol use: Yes     Comment: SOCIALLY    Drug use: Never       Family History     Family History   Problem Relation Name Age of Onset    Hypertension Mother      Diabetes Mother      Thyroid disease Mother      Hypertension Father      Diabetes Father          Allergies:  Allergies   Allergen Reactions    Itraconazole Hives        Outpatient Medications:  Current Outpatient Medications   Medication Instructions    apixaban (ELIQUIS) 5 mg, oral, 2 times daily    dorzolamide (Trusopt) 2 % ophthalmic solution 2 times daily    fluticasone (Flonase) 50 mcg/actuation nasal spray 1 spray, Each Nostril, Daily  "PRN, Shake gently. Before first use, prime pump. After use, clean tip and replace cap.    furosemide (LASIX) 20 mg, 2 times daily    hydroCHLOROthiazide (HYDRODIURIL) 50 mg, oral, Daily    latanoprost (Xalatan) 0.005 % ophthalmic solution Administer into affected eye(s).    levothyroxine (SYNTHROID, LEVOXYL) 25 mcg, oral, Daily    levothyroxine (SYNTHROID, LEVOXYL) 200 mcg, oral, Daily    losartan (COZAAR) 50 mg, oral, 2 times daily    MAGNESIUM CARBONATE ORAL 250 mg, Daily    multivitamin tablet 1 tablet, Daily    mupirocin (Bactroban) 2 % cream APPLY TO THE AFFECTED AREA(S) THREE TIMES DAILY          Labs:   CMP:  Recent Labs     08/28/24  0824 06/01/24  1053 03/25/24  0850 11/28/23  0907 10/23/23  0844 01/21/22  0934 10/21/21  0912 10/21/20  0920 12/11/19  1519    137 137 138 137   < >  --    < > 137   K 3.8 4.4 3.4* 3.6 3.4*   < >  --    < > 3.7    105 102 103 103   < >  --    < > 105   CO2 22 26 23 25 24   < >  --    < > 26   ANIONGAP 14 10 15 14 13   < >  --    < > 10   BUN 22 22 21 30* 18   < >  --    < > 19   CREATININE 1.13 0.92 1.26 1.17 0.97   < >  --    < > 0.83   EGFR 72 >90 63 70 87  --   --   --   --    MG 2.11 1.93  --   --  2.02  --  1.94  --  2.00    < > = values in this interval not displayed.     Recent Labs     08/28/24  0824 06/01/24  1053 03/25/24  0850 11/28/23  0907 10/23/23  0844   ALBUMIN 4.3 4.2 4.4 4.3 4.2   ALKPHOS 69 72 69 66 68   ALT 23 25 23 28 26   AST 22 26 25 24 22   BILITOT 1.0 0.7 0.8 0.9 0.8     CBC:  Recent Labs     08/28/24  0824 06/01/24  1053 03/25/24  0850 11/28/23  0907 10/23/23  0844   WBC 5.3 4.5 4.5 4.6 5.2   HGB 15.6 15.8 15.6 15.7 15.6   HCT 45.9 47.4 45.4 47.2 46.1    209 230 212 214   MCV 96 97 94 96 97     COAG:   Recent Labs     09/03/24  0934 12/23/22  1257   INR 0.9 1.1     ABO: No results for input(s): \"ABO\" in the last 49476 hours.  HEME/ENDO:  Recent Labs     08/28/24  0824 06/01/24  1053 03/25/24  0850 11/28/23  0907 10/23/23  0844 " 06/27/23  0816 03/21/23  1000 02/24/23  0930 12/12/22  0937 11/01/22  0830 09/09/22  0840 08/01/22  1210   FERRITIN 496* 503* 581* 444*  --  444*   < >  --    < >  --    < >  --    IRONSAT 37 36 44 47*  --   --   --   --   --   --   --   --    TSH  --  1.70  --   --  2.11 8.38*  --  6.20*  --  1.67  --  6.78*   HGBA1C  --  5.2  --   --   --   --   --  5.6  --  5.7*  --  5.8*    < > = values in this interval not displayed.      CARDIAC:   Recent Labs     09/04/24  0944 12/23/22  1257   TROPHS 4  --    BNP 82 75     Recent Labs     06/01/24  1053 10/23/23  0844 02/24/23  0930 08/01/22  1210 01/21/22  0934   CHOL 223* 221* 253* 254* 259*   LDLF  --   --  182* 152* 173*   HDL 61.0 50.0 47.0 61.0 49.0   TRIG 160* 213* 118 207* 183*     MICRO:   Recent Labs     08/28/24  0824 02/09/22  0839   CRP 0.18 0.31     No results found for the last 90 days.    Notable Studies: imaging personally reviewed   EKG:  Encounter Date: 09/05/24   ECG 12 lead (Clinic Performed)    Narrative    Normal sinus rhythm     Echocardiogram: No results found for this or any previous visit from the past 1825 days.    Stress Testing: No results found for this or any previous visit from the past 1825 days.    Cardiac Catheterization: No results found for this or any previous visit from the past 1825 days.  No results found for this or any previous visit from the past 3650 days.         REVIEW OF SYSTEMS  A 10-point system review was completed and was negative except as noted in the HPI.      VITALS  Vitals:    12/06/24 1137   BP: 124/90   Pulse: 80   SpO2: 100%       PHYSICAL EXAM  General: awake, alert and oriented. No acute distress.   Skin: Skin is warm, dry and intact without rashes or lesions.  HEENT: normocephalic, atraumatic; conjunctivae are clear without exudates or hemorrhage. Sclera is non-icteric. Eyelids are normal in appearance without swelling or lesions. Hearing intact. Nares are patent bilaterally. Moist mucous membranes.    Cardiovascular: heart rate and rhythm are normal.   Respiratory: bilateral lung sounds clear to auscultations without rales, rhonchi, or wheezes.   Extremities: pulses palpable bilaterally; no overt swelling; no wounds; telangiectasia of bilateral lower extremities  Neurological: no focal deficits; gait steady  Psychiatric: appropriate mood and affect; good judgment and insight          ASSESSMENT AND PLAN  Assessment/Plan   Diagnoses and all orders for this visit:  History of DVT of lower extremity  History of pulmonary embolus (PE)  Venous telangiectasia of lower extremity  -Patient doing well and stable  -Continue DOAC indefinitely; no overt bleeding  -Continue compression stockings      RTC: 1 year      Thank you for allowing me to participate in the care of this patient. Please reach me out if you have any questions or if you need any clarifications regarding the patient's care.    Juilet Justice DNP, APRN, FNP-C  Division of Cardiovascular Medicine  [facility]  [facility]

## 2024-12-06 ENCOUNTER — APPOINTMENT (OUTPATIENT)
Dept: CARDIOLOGY | Facility: CLINIC | Age: 64
End: 2024-12-06
Payer: COMMERCIAL

## 2024-12-06 VITALS
OXYGEN SATURATION: 100 % | SYSTOLIC BLOOD PRESSURE: 124 MMHG | BODY MASS INDEX: 40.65 KG/M2 | HEIGHT: 71 IN | HEART RATE: 80 BPM | WEIGHT: 290.4 LBS | DIASTOLIC BLOOD PRESSURE: 90 MMHG

## 2024-12-06 DIAGNOSIS — I78.1 VENOUS TELANGIECTASIA OF LOWER EXTREMITY: ICD-10-CM

## 2024-12-06 DIAGNOSIS — Z86.711 HISTORY OF PULMONARY EMBOLUS (PE): ICD-10-CM

## 2024-12-06 DIAGNOSIS — Z86.718 HISTORY OF DVT OF LOWER EXTREMITY: Primary | ICD-10-CM

## 2024-12-06 PROCEDURE — 1159F MED LIST DOCD IN RCRD: CPT

## 2024-12-06 PROCEDURE — 1036F TOBACCO NON-USER: CPT

## 2024-12-06 PROCEDURE — 3074F SYST BP LT 130 MM HG: CPT

## 2024-12-06 PROCEDURE — 3080F DIAST BP >= 90 MM HG: CPT

## 2024-12-06 PROCEDURE — 99214 OFFICE O/P EST MOD 30 MIN: CPT

## 2024-12-06 PROCEDURE — 1160F RVW MEDS BY RX/DR IN RCRD: CPT

## 2024-12-06 PROCEDURE — 3008F BODY MASS INDEX DOCD: CPT

## 2024-12-27 ENCOUNTER — APPOINTMENT (OUTPATIENT)
Dept: HEMATOLOGY/ONCOLOGY | Facility: CLINIC | Age: 64
End: 2024-12-27
Payer: COMMERCIAL

## 2024-12-31 ENCOUNTER — LAB (OUTPATIENT)
Dept: LAB | Facility: LAB | Age: 64
End: 2024-12-31
Payer: COMMERCIAL

## 2024-12-31 DIAGNOSIS — Z12.5 SCREENING PSA (PROSTATE SPECIFIC ANTIGEN): ICD-10-CM

## 2024-12-31 DIAGNOSIS — I26.99 ACUTE PULMONARY EMBOLISM, UNSPECIFIED PULMONARY EMBOLISM TYPE, UNSPECIFIED WHETHER ACUTE COR PULMONALE PRESENT (MULTI): ICD-10-CM

## 2024-12-31 DIAGNOSIS — I82.4Z9 DEEP VEIN THROMBOSIS (DVT) OF DISTAL VEIN OF LOWER EXTREMITY, UNSPECIFIED CHRONICITY, UNSPECIFIED LATERALITY (MULTI): ICD-10-CM

## 2024-12-31 DIAGNOSIS — D50.8 OTHER IRON DEFICIENCY ANEMIA: ICD-10-CM

## 2024-12-31 DIAGNOSIS — E03.9 ACQUIRED HYPOTHYROIDISM: ICD-10-CM

## 2024-12-31 DIAGNOSIS — R73.02 GLUCOSE INTOLERANCE (IMPAIRED GLUCOSE TOLERANCE): ICD-10-CM

## 2024-12-31 LAB
ALBUMIN SERPL BCP-MCNC: 4 G/DL (ref 3.4–5)
ALP SERPL-CCNC: 72 U/L (ref 33–136)
ALT SERPL W P-5'-P-CCNC: 27 U/L (ref 10–52)
ANION GAP SERPL CALC-SCNC: 12 MMOL/L (ref 10–20)
AST SERPL W P-5'-P-CCNC: 23 U/L (ref 9–39)
BASOPHILS # BLD AUTO: 0.09 X10*3/UL (ref 0–0.1)
BASOPHILS NFR BLD AUTO: 2.2 %
BILIRUB SERPL-MCNC: 0.6 MG/DL (ref 0–1.2)
BUN SERPL-MCNC: 25 MG/DL (ref 6–23)
CALCIUM SERPL-MCNC: 8.7 MG/DL (ref 8.6–10.3)
CHLORIDE SERPL-SCNC: 107 MMOL/L (ref 98–107)
CO2 SERPL-SCNC: 25 MMOL/L (ref 21–32)
CREAT SERPL-MCNC: 0.85 MG/DL (ref 0.5–1.3)
EGFRCR SERPLBLD CKD-EPI 2021: >90 ML/MIN/1.73M*2
EOSINOPHIL # BLD AUTO: 0.31 X10*3/UL (ref 0–0.7)
EOSINOPHIL NFR BLD AUTO: 7.4 %
ERYTHROCYTE [DISTWIDTH] IN BLOOD BY AUTOMATED COUNT: 12.2 % (ref 11.5–14.5)
EST. AVERAGE GLUCOSE BLD GHB EST-MCNC: 108 MG/DL
FERRITIN SERPL-MCNC: 529 NG/ML (ref 20–300)
GLUCOSE SERPL-MCNC: 104 MG/DL (ref 74–99)
HBA1C MFR BLD: 5.4 %
HCT VFR BLD AUTO: 46.3 % (ref 41–52)
HGB BLD-MCNC: 15.5 G/DL (ref 13.5–17.5)
IMM GRANULOCYTES # BLD AUTO: 0.01 X10*3/UL (ref 0–0.7)
IMM GRANULOCYTES NFR BLD AUTO: 0.2 % (ref 0–0.9)
IRON SATN MFR SERPL: 48 % (ref 25–45)
IRON SERPL-MCNC: 141 UG/DL (ref 35–150)
LYMPHOCYTES # BLD AUTO: 1.31 X10*3/UL (ref 1.2–4.8)
LYMPHOCYTES NFR BLD AUTO: 31.3 %
MAGNESIUM SERPL-MCNC: 2.12 MG/DL (ref 1.6–2.4)
MCH RBC QN AUTO: 32.6 PG (ref 26–34)
MCHC RBC AUTO-ENTMCNC: 33.5 G/DL (ref 32–36)
MCV RBC AUTO: 97 FL (ref 80–100)
MONOCYTES # BLD AUTO: 0.47 X10*3/UL (ref 0.1–1)
MONOCYTES NFR BLD AUTO: 11.2 %
NEUTROPHILS # BLD AUTO: 1.99 X10*3/UL (ref 1.2–7.7)
NEUTROPHILS NFR BLD AUTO: 47.7 %
NRBC BLD-RTO: 0 /100 WBCS (ref 0–0)
PLATELET # BLD AUTO: 219 X10*3/UL (ref 150–450)
POTASSIUM SERPL-SCNC: 4.2 MMOL/L (ref 3.5–5.3)
PROT SERPL-MCNC: 6.3 G/DL (ref 6.4–8.2)
PSA SERPL-MCNC: 1.14 NG/ML
RBC # BLD AUTO: 4.76 X10*6/UL (ref 4.5–5.9)
SODIUM SERPL-SCNC: 140 MMOL/L (ref 136–145)
T4 FREE SERPL-MCNC: 0.69 NG/DL (ref 0.61–1.12)
TIBC SERPL-MCNC: 294 UG/DL (ref 240–445)
TSH SERPL-ACNC: 5.11 MIU/L (ref 0.44–3.98)
UIBC SERPL-MCNC: 153 UG/DL (ref 110–370)
VIT B12 SERPL-MCNC: 339 PG/ML (ref 211–911)
WBC # BLD AUTO: 4.2 X10*3/UL (ref 4.4–11.3)

## 2024-12-31 PROCEDURE — 81241 F5 GENE: CPT

## 2024-12-31 PROCEDURE — 81240 F2 GENE: CPT

## 2025-01-02 ENCOUNTER — TELEPHONE (OUTPATIENT)
Dept: PRIMARY CARE | Facility: CLINIC | Age: 65
End: 2025-01-02
Payer: MEDICARE

## 2025-01-02 NOTE — TELEPHONE ENCOUNTER
----- Message from Alpa Saini sent at 12/31/2024  6:57 PM EST -----  Please call the patient regarding his abnormal result.  Thyroid is min off - visit is scheduled in the near future -nothing urgent but recommend keeping visit in the near future so we can further discuss and possible med changes   Thanks

## 2025-01-03 ENCOUNTER — OFFICE VISIT (OUTPATIENT)
Dept: HEMATOLOGY/ONCOLOGY | Facility: CLINIC | Age: 65
End: 2025-01-03
Payer: MEDICARE

## 2025-01-03 VITALS
DIASTOLIC BLOOD PRESSURE: 89 MMHG | RESPIRATION RATE: 16 BRPM | TEMPERATURE: 97.2 F | WEIGHT: 300 LBS | OXYGEN SATURATION: 98 % | BODY MASS INDEX: 41.84 KG/M2 | SYSTOLIC BLOOD PRESSURE: 146 MMHG | HEART RATE: 79 BPM

## 2025-01-03 DIAGNOSIS — R79.89 ELEVATED FERRITIN: ICD-10-CM

## 2025-01-03 DIAGNOSIS — I26.99 ACUTE PULMONARY EMBOLISM, UNSPECIFIED PULMONARY EMBOLISM TYPE, UNSPECIFIED WHETHER ACUTE COR PULMONALE PRESENT (MULTI): ICD-10-CM

## 2025-01-03 DIAGNOSIS — I82.4Z9 DEEP VEIN THROMBOSIS (DVT) OF DISTAL VEIN OF LOWER EXTREMITY, UNSPECIFIED CHRONICITY, UNSPECIFIED LATERALITY (MULTI): ICD-10-CM

## 2025-01-03 DIAGNOSIS — D50.8 OTHER IRON DEFICIENCY ANEMIA: ICD-10-CM

## 2025-01-03 DIAGNOSIS — E53.8 LOW SERUM VITAMIN B12: ICD-10-CM

## 2025-01-03 DIAGNOSIS — Z86.718 HISTORY OF DVT OF LOWER EXTREMITY: ICD-10-CM

## 2025-01-03 PROCEDURE — 3079F DIAST BP 80-89 MM HG: CPT

## 2025-01-03 PROCEDURE — 1125F AMNT PAIN NOTED PAIN PRSNT: CPT

## 2025-01-03 PROCEDURE — 1159F MED LIST DOCD IN RCRD: CPT

## 2025-01-03 PROCEDURE — 99214 OFFICE O/P EST MOD 30 MIN: CPT

## 2025-01-03 PROCEDURE — 3077F SYST BP >= 140 MM HG: CPT

## 2025-01-03 ASSESSMENT — PAIN SCALES - GENERAL: PAINLEVEL_OUTOF10: 4

## 2025-01-03 NOTE — PATIENT INSTRUCTIONS
Discussed and reviewed medical history  Review labs- Ferritin remains slightly elevated 529  Discussed staying hydrated, reducing alcohol consumption  Encouraged to continue on Eliquis 5mg 1 tablet twice daily  Labs prior to follow-up  Return to see APRN in 4 months

## 2025-01-03 NOTE — PROGRESS NOTES
Pt instructed to get labs done prior to next appt  Rtc 5/2 1030 for CNP visit  Reviewed AVS with patient- patient verbalizes understanding

## 2025-01-03 NOTE — PROGRESS NOTES
Patient ID: Rosalba Ba is a 65 y.o. male.    Subjective  HPI     Patient Visit Information:   Visit Type: Follow Up Visit      History of Present Illness:      ID Statement:    ROSALBA BA is a 63 year old Male        Chief Complaint: Evaluation for elevated ferritin   Interval History:    Referred by Alpa Saiin PA-C      Reason for referral: elevated ferritin      HPI   62 year old man with hx of hypothyroidism, obesity, HTN, HL, leg-Calve-Perthes disease  (idiopathic AVN of hip) who presents for evaluation of elevated ferritin. His labs showed elevated ferritin 503-554 since Oct 2021, Tsat 31-43%.  He is feeling ok in general   he had no recent infections or fever   walks usually with his cane, limited mobility secondary to his hip problems   he does not have a lot of pain though   eats well, no nausea or vomiting   no bleeding, no hematuria, hematochezia or melena    energy is fair   he is not very active though      interval history- 1/3/25     No falls, or recent hospital admissions  Energy is decent  Remains walking with a cane  No bleeding or bruising issues      Chronic lower back and right hip pain, remains on 800 mg ibuprofen, helps manage the pain     No numbness tingling hands or feet   No edema in legs  continues to wear compression stockings  Returned to walking on the treadmill, 1-2 miles     Appetite is good, eating well trying to healthier  No nausea or vomiting   Bowels are regular, or GI issues   drinking water and lots of coffee     No urinary issues   No hematuria, hematochezia or melena   no fevers, chills or infections  No SOB, no chest pain or palpitations  Drinking more with holidays- 3-4 times weekly 3-4 drinks at time        Objective    BSA: 2.61 meters squared  /89   Pulse 79   Temp 36.2 °C (97.2 °F) (Skin)   Resp 16   Wt 136 kg (300 lb)   SpO2 98%   BMI 41.84 kg/m²      Physical Exam  Vitals and nursing note reviewed.   Constitutional:       Appearance: Normal  appearance.   HENT:      Head: Normocephalic and atraumatic.      Right Ear: External ear normal.      Left Ear: External ear normal.      Nose: Nose normal.      Mouth/Throat:      Mouth: Mucous membranes are moist.      Pharynx: Oropharynx is clear.   Eyes:      General: No scleral icterus.     Extraocular Movements: Extraocular movements intact.      Conjunctiva/sclera: Conjunctivae normal.   Cardiovascular:      Rate and Rhythm: Normal rate and regular rhythm.      Pulses: Normal pulses.      Heart sounds: Normal heart sounds.   Pulmonary:      Effort: Pulmonary effort is normal.      Breath sounds: Normal breath sounds.   Abdominal:      Palpations: Abdomen is soft.      Tenderness: There is no abdominal tenderness.   Musculoskeletal:         General: Swelling present. Normal range of motion.      Cervical back: Normal range of motion.      Right lower leg: Edema present.      Left lower leg: Edema present.   Skin:     General: Skin is warm and dry.      Coloration: Skin is pale.   Neurological:      General: No focal deficit present.      Mental Status: He is alert and oriented to person, place, and time.      Gait: Gait abnormal.   Psychiatric:         Mood and Affect: Mood normal.         Behavior: Behavior normal.         Thought Content: Thought content normal.         Judgment: Judgment normal.       Performance Status:  Asymptomatic      Assessment/Plan        1. 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   we discussed the differential diagnosis in his case which includes:   -hereditary hemochromatosis   -inflammatory secondary to alcohol,  obesity and possibly low grade steatohepatitis from both      his testing showed heterozygous H63D mutation which typically would not lead to the hemochromatosis phenotype  we discussed that we are going to hold phlebotomies and obtain MRI of the liver to assess iron content/deposition   the other most likely culprit for the high ferritin seems to be alcohol intake    absolute alcohol abstinence advised      8/30/24-Patient reported right leg pain that started approximately 5 weeks ago. States feels like a bad omar horse in his calf. He denies any known trauma to the leg, no recent log distance travel. Stopped walking on the treadmill, due to being so busy around the house and from the leg pain. Mild SOB a few weeks ago, that has resolved. Denies any chest pain or pressure. Appetite is good, he is trying to stay hydrated by drinking plenty of water. Chronic lower back pain, and right hip pain unchanged, manages with OTC ibuprofen, as needed. Admits to consuming alcohol again mainly on the weekends, to cope with stress. Emphasized the importance of abstaining from consuming alcohol.      Ferritin remains elevated but stable at 496. Discussed that we continue to hold of on proceeding with phlebotomies at this time. Will recheck labs in 4-5 months and consider phlebotomy if Ferritin remains >500.       Discussed ordering a right venous doppler to rule out a blood clot, pt is agreeable, will schedule accordingly. Educated patient on signs and symptoms that warrant urgent care.    1/3/25: Ferritin remain elevated 529. Patient admits with the holidays indulging in some alcohol, more than he had been. He remains trying to lose weight, recently was able to return to walking on the treadmill and is trying to eat healthier. Denies any increased join/pain. Remains to have a significant amount of family stress with raising multiple grand children.     Discussed holding on phlebotomies at this time. Encouraged to decrease/refrain from consuming alcoholic beverages, remain adequate hydration. Will continue to monitor labs at this time.         2. DVT/PE:  9/3/24: Notified regarding Doppler results- positive for acute occlusive DVT in the gastrocnemius veins in the calf, also reported an age indeterminate DVT visualized in the popliteal vein and chronic changes visualized in the proximal  femoral, mid femoral and distal femoral veins.    - CT reviewed- acute occlusive pulmonary embolism involving segmental and subsegmental posteromedial right lower lobe pulmonary arteries. Mildly elevated RV/LV ratio 1.22, indicating an element of right heart strain  - Eliquis 5mg (2 tablets (10 mg) by mouth 2 times       9/27/24  - patient is in office for follow-up since starting Eliquis  - denies any abnormal bleeding/bruising  - denies any new clotting events or concerns  - remains to have cramping pain in his right LLE  - discussed continuation of Eliquis indefinitely at this time  - will continue to monitor patient for bleeding risks    1/3/25: Remains taking Eliquis 5mg 1 tablet twice daily. Denies any new clotting/bleeding events. Overall reports he feels well today. Discussed continuation of monitoring for bleeding risks. His recent labs look stable, no indication of bleeding. Will continue to monitor labs at this time.         Plan:   Discussed and reviewed medical history  Review labs- Ferritin remains slightly elevated 529  Discussed staying hydrated, reducing alcohol consumption  Encouraged to continue on Eliquis 5mg 1 tablet twice daily  Labs prior to follow-up  Return to see APRN in 4 months      (CBC w/diff, CMP, Ferritin, Iron Panel)     Patient is aware on the interm to contact the office with any question or concerns.     Alpa James, CRISTÓBAL-CNP

## 2025-01-06 LAB
ELECTRONICALLY SIGNED BY: NORMAL
ELECTRONICALLY SIGNED BY: NORMAL
FACTOR II-PROTHROMBIN INTERPRETATION: NORMAL
FACTOR II-PROTHROMBIN RESULT: NORMAL
FACTOR V LEIDEN INTERPRETATION: NORMAL
FACTOR V LEIDEN RESULT: NORMAL

## 2025-01-13 ENCOUNTER — APPOINTMENT (OUTPATIENT)
Dept: PRIMARY CARE | Facility: CLINIC | Age: 65
End: 2025-01-13
Payer: COMMERCIAL

## 2025-01-13 VITALS
WEIGHT: 295 LBS | HEART RATE: 74 BPM | DIASTOLIC BLOOD PRESSURE: 90 MMHG | BODY MASS INDEX: 41.3 KG/M2 | HEIGHT: 71 IN | OXYGEN SATURATION: 99 % | SYSTOLIC BLOOD PRESSURE: 132 MMHG

## 2025-01-13 DIAGNOSIS — Z86.0100 HISTORY OF COLON POLYPS: ICD-10-CM

## 2025-01-13 DIAGNOSIS — E66.01 CLASS 3 SEVERE OBESITY DUE TO EXCESS CALORIES WITH SERIOUS COMORBIDITY AND BODY MASS INDEX (BMI) OF 40.0 TO 44.9 IN ADULT: ICD-10-CM

## 2025-01-13 DIAGNOSIS — I10 ESSENTIAL (PRIMARY) HYPERTENSION: ICD-10-CM

## 2025-01-13 DIAGNOSIS — E66.813 CLASS 3 SEVERE OBESITY DUE TO EXCESS CALORIES WITH SERIOUS COMORBIDITY AND BODY MASS INDEX (BMI) OF 40.0 TO 44.9 IN ADULT: ICD-10-CM

## 2025-01-13 DIAGNOSIS — R73.02 GLUCOSE INTOLERANCE (IMPAIRED GLUCOSE TOLERANCE): ICD-10-CM

## 2025-01-13 DIAGNOSIS — E78.00 HYPERCHOLESTEROLEMIA: ICD-10-CM

## 2025-01-13 DIAGNOSIS — E03.9 HYPOTHYROIDISM, UNSPECIFIED: Primary | ICD-10-CM

## 2025-01-13 DIAGNOSIS — I10 PRIMARY HYPERTENSION: ICD-10-CM

## 2025-01-13 DIAGNOSIS — Z23 NEED FOR INFLUENZA VACCINATION: ICD-10-CM

## 2025-01-13 PROCEDURE — 3008F BODY MASS INDEX DOCD: CPT | Performed by: PHYSICIAN ASSISTANT

## 2025-01-13 PROCEDURE — 3080F DIAST BP >= 90 MM HG: CPT | Performed by: PHYSICIAN ASSISTANT

## 2025-01-13 PROCEDURE — 99214 OFFICE O/P EST MOD 30 MIN: CPT | Performed by: PHYSICIAN ASSISTANT

## 2025-01-13 PROCEDURE — 1159F MED LIST DOCD IN RCRD: CPT | Performed by: PHYSICIAN ASSISTANT

## 2025-01-13 PROCEDURE — 1160F RVW MEDS BY RX/DR IN RCRD: CPT | Performed by: PHYSICIAN ASSISTANT

## 2025-01-13 PROCEDURE — 90662 IIV NO PRSV INCREASED AG IM: CPT | Performed by: PHYSICIAN ASSISTANT

## 2025-01-13 PROCEDURE — 1036F TOBACCO NON-USER: CPT | Performed by: PHYSICIAN ASSISTANT

## 2025-01-13 PROCEDURE — 3075F SYST BP GE 130 - 139MM HG: CPT | Performed by: PHYSICIAN ASSISTANT

## 2025-01-13 PROCEDURE — G0008 ADMIN INFLUENZA VIRUS VAC: HCPCS | Performed by: PHYSICIAN ASSISTANT

## 2025-01-13 RX ORDER — HYDROCHLOROTHIAZIDE 50 MG/1
50 TABLET ORAL DAILY
Qty: 30 TABLET | Refills: 11 | Status: SHIPPED | OUTPATIENT
Start: 2025-01-13

## 2025-01-13 RX ORDER — LOSARTAN POTASSIUM 50 MG/1
50 TABLET ORAL 2 TIMES DAILY
Qty: 60 TABLET | Refills: 5 | Status: SHIPPED | OUTPATIENT
Start: 2025-01-13

## 2025-01-13 RX ORDER — LEVOTHYROXINE SODIUM 25 UG/1
25 TABLET ORAL DAILY
Qty: 30 TABLET | Refills: 11 | Status: SHIPPED | OUTPATIENT
Start: 2025-01-13

## 2025-01-13 ASSESSMENT — ENCOUNTER SYMPTOMS
HEADACHES: 0
BRUISES/BLEEDS EASILY: 0
NAUSEA: 0
CONFUSION: 0
CONSTIPATION: 0
FREQUENCY: 0
COUGH: 0
WHEEZING: 0
ARTHRALGIAS: 1
SINUS PAIN: 0
BACK PAIN: 0
DIARRHEA: 0
FATIGUE: 1
SORE THROAT: 0
EYE REDNESS: 0
NECK PAIN: 0
DIZZINESS: 0
PALPITATIONS: 0
TREMORS: 0
ABDOMINAL PAIN: 0
WOUND: 0
SLEEP DISTURBANCE: 0
CHILLS: 0
FLANK PAIN: 0
VOMITING: 0
NUMBNESS: 0
RHINORRHEA: 0
FEVER: 0
EYE DISCHARGE: 0
CHEST TIGHTNESS: 0
SHORTNESS OF BREATH: 0

## 2025-01-13 ASSESSMENT — PATIENT HEALTH QUESTIONNAIRE - PHQ9
2. FEELING DOWN, DEPRESSED OR HOPELESS: NOT AT ALL
1. LITTLE INTEREST OR PLEASURE IN DOING THINGS: NOT AT ALL
SUM OF ALL RESPONSES TO PHQ9 QUESTIONS 1 AND 2: 0

## 2025-01-13 NOTE — PROGRESS NOTES
Subjective   Patient ID: Devon Medina is a 65 y.o. male who presents for Follow-up (7 MONTH F/U WITH LABS. )    HPI  LABS     Med check   hypothyroid -taking meds -225 mcg daily- he has recently started diet and ex and would like to hold off on med change but cont with same dose and diet and ex and re-eval in 6 mo but will monitor for symptoms in the meantime   HTN - home readings are typically 110-130s/70-80  mag - cont on supplement   glaucoma - on drop and following with dr colin leiva perttao- R - stable- following with ortho/pod on occasion - pt feels it is getting worse - leg /foot cramping and discomfort . He denies following with ortho or pod in sometime and states he is dealing with so much pain he is needing to take ibuprofen 600-800 at bedtime to help him get out of bed the next morning   elevated ferritin - additional labs done and he was referred to heme who he is following with at this time.   LE edema /lymphedmea /chronic venous insuff - following with lymphedema clinic in [place] and has moses hose and overall back to good baseline - not on lasix/K at this time - discussed water therapy but didn't want to do in [place] - is worried about the cost     Preventative testing   PSA - DEC 2024   colonoscopy - oct 2022 -polyps - repeat in 3 years   Depression Screen - PHQ2 NEG Jan 2025   Fall -May 2024- tripped over landscaping and laceration L knee      flu shot given Jan 2025 - in office - tolerated well         Patient Active Problem List   Diagnosis    Elevated ferritin    Glaucoma    Glucose intolerance (impaired glucose tolerance)    History of colon polyps    Hypercholesterolemia    Hypertension    Hypothyroidism    Irregular heart rate    Left knee pain    Gahv-Hgstr-Bthmpcp disease (HHS-HCC)    Low back pain    Low serum vitamin B12    Lower extremity edema    Muscle cramps    Class 3 severe obesity due to excess calories with serious comorbidity and body mass index (BMI) of 40.0 to 44.9 in  adult    Rash    Right shoulder pain    Primary open-angle glaucoma, bilateral, mild stage    History of pulmonary embolus (PE)    History of DVT of lower extremity    Venous telangiectasia of lower extremity       Review of Systems   Constitutional:  Positive for fatigue. Negative for chills and fever.   HENT:  Negative for congestion, rhinorrhea, sinus pain, sore throat and tinnitus.    Eyes:  Negative for discharge, redness and visual disturbance.   Respiratory:  Negative for cough, chest tightness, shortness of breath and wheezing.    Cardiovascular:  Positive for leg swelling. Negative for chest pain and palpitations.   Gastrointestinal:  Negative for abdominal pain, constipation, diarrhea, nausea and vomiting.   Endocrine: Negative for cold intolerance and heat intolerance.   Genitourinary:  Negative for flank pain, frequency and urgency.   Musculoskeletal:  Positive for arthralgias and gait problem. Negative for back pain and neck pain.   Skin:  Negative for rash and wound.   Neurological:  Negative for dizziness, tremors, syncope, numbness and headaches.   Hematological:  Does not bruise/bleed easily.   Psychiatric/Behavioral:  Negative for confusion, sleep disturbance and suicidal ideas.        Past Medical History:   Diagnosis Date    Bleb, lung (Multi) 06/28/2023    Encounter for examination of eyes and vision without abnormal findings     Diabetic eye exam    Encounter for screening for malignant neoplasm of prostate 06/30/2021    Screening PSA (prostate specific antigen)    Rib fracture 06/28/2023       Past Surgical History:   Procedure Laterality Date    OTHER SURGICAL HISTORY  10/12/2020    [place] tooth extraction    OTHER SURGICAL HISTORY  10/12/2020    Lung lobectomy    OTHER SURGICAL HISTORY  10/12/2020    Testicular torsion repair    OTHER SURGICAL HISTORY  07/28/2021    Colonoscopy       Family History   Problem Relation Name Age of Onset    Hypertension Mother      Diabetes Mother      Thyroid  disease Mother      Hypertension Father      Diabetes Father         Social History     Tobacco Use    Smoking status: Former     Current packs/day: 0.00     Types: Cigarettes     Quit date:      Years since quittin.0    Smokeless tobacco: Never   Vaping Use    Vaping status: Never Used   Substance Use Topics    Alcohol use: Yes     Alcohol/week: 6.0 - 8.0 standard drinks of alcohol     Types: 6 - 8 Cans of beer per week    Drug use: Never       Allergies   Allergen Reactions    Itraconazole Hives       Current Outpatient Medications   Medication Sig Dispense Refill    apixaban (Eliquis) 5 mg tablet Take 1 tablet (5 mg) by mouth 2 times a day. 180 tablet 3    dorzolamide (Trusopt) 2 % ophthalmic solution Administer into affected eye(s) twice a day.      hydroCHLOROthiazide (HYDRODiuril) 50 mg tablet TAKE 1 TABLET BY MOUTH DAILY 90 tablet 3    latanoprost (Xalatan) 0.005 % ophthalmic solution Administer into affected eye(s).      levothyroxine (Synthroid, Levoxyl) 200 mcg tablet Take 1 tablet (200 mcg) by mouth once daily. 30 tablet 11    levothyroxine (Synthroid, Levoxyl) 25 mcg tablet Take 1 tablet (25 mcg) by mouth once daily. 90 tablet 3    losartan (Cozaar) 50 mg tablet Take 1 tablet (50 mg) by mouth 2 times a day. 60 tablet 5    MAGNESIUM CARBONATE ORAL Take 250 mg by mouth once daily.      multivitamin tablet Take 1 tablet by mouth once daily.      mupirocin (Bactroban) 2 % cream APPLY TO THE AFFECTED AREA(S) THREE TIMES DAILY      fluticasone (Flonase) 50 mcg/actuation nasal spray Administer 1 spray into each nostril once daily as needed for rhinitis or allergies. Shake gently. Before first use, prime pump. After use, clean tip and replace cap. (Patient not taking: Reported on 2024) 16 g 5    furosemide (Lasix) 20 mg tablet Take 1 tablet (20 mg) by mouth 2 times a day. (Patient not taking: Reported on 2025)       No current facility-administered medications for this visit.       Objective  "  BP (!) 130/98   Pulse 74   Ht 1.803 m (5' 11\")   Wt 134 kg (295 lb)   SpO2 99%   BMI 41.14 kg/m²     Physical Exam  Vitals reviewed.   Constitutional:       Appearance: Normal appearance.   HENT:      Head: Normocephalic.      Right Ear: External ear normal.      Left Ear: External ear normal.      Nose: Nose normal. No congestion or rhinorrhea.      Mouth/Throat:      Mouth: Mucous membranes are moist.   Eyes:      Extraocular Movements: Extraocular movements intact.      Conjunctiva/sclera: Conjunctivae normal.      Pupils: Pupils are equal, round, and reactive to light.   Cardiovascular:      Rate and Rhythm: Normal rate and regular rhythm.      Pulses: Normal pulses.      Comments: Chronic bilat LE edema - stable   Pulmonary:      Effort: Pulmonary effort is normal.      Breath sounds: Normal breath sounds.   Abdominal:      General: Bowel sounds are normal.      Palpations: Abdomen is soft.      Tenderness: There is no abdominal tenderness. There is no right CVA tenderness or left CVA tenderness.   Musculoskeletal:         General: Tenderness present. Normal range of motion.      Cervical back: Normal range of motion and neck supple. No tenderness.      Comments: Walking stick    Skin:     General: Skin is warm and dry.   Neurological:      General: No focal deficit present.      Mental Status: He is alert and oriented to person, place, and time.   Psychiatric:         Mood and Affect: Mood normal.         Behavior: Behavior normal.         Testing   Component      Latest Ref [facility] 12/31/2024   GLUCOSE      74 - 99 mg/dL 104 (H)    SODIUM      136 - 145 mmol/L 140    POTASSIUM      3.5 - 5.3 mmol/L 4.2    CHLORIDE      98 - 107 mmol/L 107    Bicarbonate      21 - 32 mmol/L 25    Anion Gap      10 - 20 mmol/L 12    Blood Urea Nitrogen      6 - 23 mg/dL 25 (H)    Creatinine      0.50 - 1.30 mg/dL 0.85    EGFR      >60 mL/min/1.73m*2 >90    Calcium      8.6 - 10.3 mg/dL 8.7    Albumin      3.4 - 5.0 g/dL 4.0  "   Alkaline Phosphatase      33 - 136 U/L 72    Total Protein      6.4 - 8.2 g/dL 6.3 (L)    AST      9 - 39 U/L 23    Bilirubin Total      0.0 - 1.2 mg/dL 0.6    ALT      10 - 52 U/L 27    Hemoglobin A1C      See comment % 5.4    Estimated Average Glucose      Not Established mg/dL 108    Thyroid Stimulating Hormone      0.44 - 3.98 mIU/L 5.11 (H)    Thyroxine, Free      0.61 - 1.12 ng/dL 0.69    MAGNESIUM      1.60 - 2.40 mg/dL 2.12    Prostate Specific Antigen,Screen      <=4.00 ng/mL 1.14       Thyroid - monitor   Glucose intolerance - work on diet and ex     Impression    MDM    1) COMPLEXITY: MORE THAN 1 STABLE CHRONIC CONDITION ADDRESSED  2)DATA: TESTS INTERPRETED AND OR ORDERED, TOOK INDEPENDENT HISTORY OR RECORDS REVIEWED  3)RISK: MODERATE RISK DUE TO NATURE OF MEDICAL CONDITIONS/COMORBIDITY OR MEDICATIONS ORDERED OR SURGICAL OR PROCEDURE REFERRAL, .       Reviewed labs and Testing on file   Patient to follow diet low in cholesterol, fat, and sodium.    Patient is advised to increase Exercise.  Patient is recommended to lose weight.  Reviewed Meds and discussed common side effects  Continue as directed   Patient is strongly advised to be compliant with recommendations.    Return to Clinic sooner if needed.  Patient denies further questions/concerns at this time     Assessment/Plan   Problem List Items Addressed This Visit             ICD-10-CM    Glucose intolerance (impaired glucose tolerance) R73.02    Relevant Orders    CBC and Auto Differential    Comprehensive Metabolic Panel    Hemoglobin A1C    Lipid Panel    Thyroid Stimulating Hormone    Thyroxine, Free    History of colon polyps Z86.0100    Hypercholesterolemia E78.00    Relevant Orders    Lipid Panel    Hypertension I10    Relevant Medications    losartan (Cozaar) 50 mg tablet    Class 3 severe obesity due to excess calories with serious comorbidity and body mass index (BMI) of 40.0 to 44.9 in adult E66.813, E66.01, Z68.41     Other Visit  Diagnoses         Codes    Hypothyroidism, unspecified    -  Primary E03.9    Relevant Medications    levothyroxine (Synthroid, Levoxyl) 25 mcg tablet    Other Relevant Orders    Thyroid Stimulating Hormone    Thyroxine, Free    Essential (primary) hypertension     I10    Relevant Medications    hydroCHLOROthiazide (HYDRODiuril) 50 mg tablet    Need for influenza vaccination     Z23    Relevant Orders    Flu vaccine, trivalent, preservative free, HIGH-DOSE, age 65y+ (Fluzone) (Completed)             FU in July with medicare wellness and labs at [facility] fasting and med check

## 2025-02-27 ENCOUNTER — APPOINTMENT (OUTPATIENT)
Dept: HEMATOLOGY/ONCOLOGY | Facility: CLINIC | Age: 65
End: 2025-02-27
Payer: MEDICARE

## 2025-04-30 ENCOUNTER — LAB (OUTPATIENT)
Dept: LAB | Facility: HOSPITAL | Age: 65
End: 2025-04-30
Payer: COMMERCIAL

## 2025-04-30 DIAGNOSIS — E53.8 LOW SERUM VITAMIN B12: ICD-10-CM

## 2025-04-30 DIAGNOSIS — I26.99 ACUTE PULMONARY EMBOLISM, UNSPECIFIED PULMONARY EMBOLISM TYPE, UNSPECIFIED WHETHER ACUTE COR PULMONALE PRESENT (MULTI): ICD-10-CM

## 2025-04-30 DIAGNOSIS — R79.89 ELEVATED FERRITIN: ICD-10-CM

## 2025-04-30 DIAGNOSIS — Z86.718 HISTORY OF DVT OF LOWER EXTREMITY: ICD-10-CM

## 2025-04-30 DIAGNOSIS — I82.4Z9 DEEP VEIN THROMBOSIS (DVT) OF DISTAL VEIN OF LOWER EXTREMITY, UNSPECIFIED CHRONICITY, UNSPECIFIED LATERALITY: ICD-10-CM

## 2025-04-30 DIAGNOSIS — D50.8 OTHER IRON DEFICIENCY ANEMIA: ICD-10-CM

## 2025-04-30 LAB
ALBUMIN SERPL BCP-MCNC: 4.4 G/DL (ref 3.4–5)
ALP SERPL-CCNC: 71 U/L (ref 33–136)
ALT SERPL W P-5'-P-CCNC: 37 U/L (ref 10–52)
ANION GAP SERPL CALC-SCNC: 15 MMOL/L (ref 10–20)
AST SERPL W P-5'-P-CCNC: 35 U/L (ref 9–39)
BASOPHILS # BLD AUTO: 0.04 X10*3/UL (ref 0–0.1)
BASOPHILS NFR BLD AUTO: 0.9 %
BILIRUB SERPL-MCNC: 1.1 MG/DL (ref 0–1.2)
BUN SERPL-MCNC: 19 MG/DL (ref 6–23)
CALCIUM SERPL-MCNC: 9.1 MG/DL (ref 8.6–10.3)
CHLORIDE SERPL-SCNC: 104 MMOL/L (ref 98–107)
CO2 SERPL-SCNC: 23 MMOL/L (ref 21–32)
CREAT SERPL-MCNC: 0.91 MG/DL (ref 0.5–1.3)
EGFRCR SERPLBLD CKD-EPI 2021: >90 ML/MIN/1.73M*2
EOSINOPHIL # BLD AUTO: 0.13 X10*3/UL (ref 0–0.7)
EOSINOPHIL NFR BLD AUTO: 2.9 %
ERYTHROCYTE [DISTWIDTH] IN BLOOD BY AUTOMATED COUNT: 12.6 % (ref 11.5–14.5)
FERRITIN SERPL-MCNC: 635 NG/ML (ref 20–300)
GLUCOSE SERPL-MCNC: 95 MG/DL (ref 74–99)
HCT VFR BLD AUTO: 46 % (ref 41–52)
HGB BLD-MCNC: 15.5 G/DL (ref 13.5–17.5)
HGB RETIC QN: 37 PG (ref 28–38)
IMM GRANULOCYTES # BLD AUTO: 0.01 X10*3/UL (ref 0–0.7)
IMM GRANULOCYTES NFR BLD AUTO: 0.2 % (ref 0–0.9)
IMMATURE RETIC FRACTION: 12.2 %
IRON SATN MFR SERPL: 31 % (ref 25–45)
IRON SERPL-MCNC: 92 UG/DL (ref 35–150)
LYMPHOCYTES # BLD AUTO: 1.06 X10*3/UL (ref 1.2–4.8)
LYMPHOCYTES NFR BLD AUTO: 23.5 %
MCH RBC QN AUTO: 33 PG (ref 26–34)
MCHC RBC AUTO-ENTMCNC: 33.7 G/DL (ref 32–36)
MCV RBC AUTO: 98 FL (ref 80–100)
MONOCYTES # BLD AUTO: 0.59 X10*3/UL (ref 0.1–1)
MONOCYTES NFR BLD AUTO: 13.1 %
NEUTROPHILS # BLD AUTO: 2.69 X10*3/UL (ref 1.2–7.7)
NEUTROPHILS NFR BLD AUTO: 59.4 %
NRBC BLD-RTO: 0 /100 WBCS (ref 0–0)
PLATELET # BLD AUTO: 219 X10*3/UL (ref 150–450)
POTASSIUM SERPL-SCNC: 3.8 MMOL/L (ref 3.5–5.3)
PROT SERPL-MCNC: 6.9 G/DL (ref 6.4–8.2)
RBC # BLD AUTO: 4.7 X10*6/UL (ref 4.5–5.9)
RETICS #: 0.12 X10*6/UL (ref 0.02–0.11)
RETICS/RBC NFR AUTO: 2.5 % (ref 0.5–2)
SODIUM SERPL-SCNC: 138 MMOL/L (ref 136–145)
TIBC SERPL-MCNC: 301 UG/DL (ref 240–445)
UIBC SERPL-MCNC: 209 UG/DL (ref 110–370)
VIT B12 SERPL-MCNC: 410 PG/ML (ref 211–911)
WBC # BLD AUTO: 4.5 X10*3/UL (ref 4.4–11.3)

## 2025-04-30 PROCEDURE — 82607 VITAMIN B-12: CPT

## 2025-04-30 PROCEDURE — 36415 COLL VENOUS BLD VENIPUNCTURE: CPT

## 2025-05-01 NOTE — PROGRESS NOTES
Patient ID: Rosalba Ba is a 65 y.o. male.    Subjective    HPI    Patient Visit Information:   Visit Type: Follow Up Visit      History of Present Illness:      ID Statement:    ROSALBA BA is a 63 year old Male        Chief Complaint: Evaluation for elevated ferritin   Interval History:    Referred by Alpa Saini PA-C      Reason for referral: elevated ferritin      HPI   62 year old man with hx of hypothyroidism, obesity, HTN, HL, leg-Calve-Perthes disease  (idiopathic AVN of hip) who presents for evaluation of elevated ferritin. His labs showed elevated ferritin [id number] since Oct 2021, Tsat 31-43%.  He is feeling ok in general   he had no recent infections or fever   walks usually with his cane, limited mobility secondary to his hip problems   he does not have a lot of pain though   eats well, no nausea or vomiting   no bleeding, no hematuria, hematochezia or melena    energy is fair   he is not very active though      interval history- 5/2/25    Remains drinking reports 2-3 times weekly, mainly beer     Recent fall, reports missed the step after drinking a few to many [employer]'s and pass out trying to go into his house, he believes that he landed on his back side, and unsure if he hit his head, no reported medical exam    Under an immense amount of stress/taking care of multiple grandkids    Energy is okay, trying to walk daily   Remains walking with a cane  No bleeding or bruising issues      Monday am woke with blood on his pillow from his right ear, denies any any ear pain      Chronic lower back and right hip pain, taking Tylenol 1000 mg daily, helps manage the pain     No numbness tingling hands or feet   Mild edema,   continues to wear compression stockings daily  Walks on the treadmill, 1-2 miles daily     Appetite is good, eating well trying to healthier  No nausea or vomiting   Bowels are regular, or GI issues   drinking water and lots of coffee     No urinary issues   No hematuria,  hematochezia or melena   no fevers, chills or infections  No SOB, no chest pain or palpitations          Objective    BSA: There is no height or weight on file to calculate BSA.  There were no vitals taken for this visit.     Physical Exam  Vitals and nursing note reviewed.   Constitutional:       Appearance: Normal appearance. He is obese.      Comments: Ambulates with a cane   HENT:      Head: Normocephalic and atraumatic.      Nose: Nose normal.      Mouth/Throat:      Mouth: Mucous membranes are moist.      Pharynx: Oropharynx is clear.   Eyes:      General: No scleral icterus.     Extraocular Movements: Extraocular movements intact.      Conjunctiva/sclera: Conjunctivae normal.   Cardiovascular:      Rate and Rhythm: Normal rate and regular rhythm.      Pulses: Normal pulses.      Heart sounds: Normal heart sounds.   Pulmonary:      Effort: Pulmonary effort is normal.      Breath sounds: Normal breath sounds.   Abdominal:      Palpations: Abdomen is soft.      Tenderness: There is no abdominal tenderness.   Musculoskeletal:         General: Swelling present. Normal range of motion.      Cervical back: Normal range of motion.   Lymphadenopathy:      Cervical: No cervical adenopathy.   Skin:     General: Skin is warm and dry.      Findings: Bruising present.   Neurological:      General: No focal deficit present.      Mental Status: He is alert and oriented to person, place, and time.      Gait: Gait abnormal.   Psychiatric:         Mood and Affect: Mood normal.         Behavior: Behavior normal.         Thought Content: Thought content normal.         Judgment: Judgment normal.         Performance Status:  Symptomatic; fully ambulatory      Assessment/Plan        1. 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   we discussed the differential diagnosis in his case which includes:   -hereditary hemochromatosis   -inflammatory secondary to alcohol,  obesity and possibly low grade steatohepatitis from both      his  testing showed heterozygous H63D mutation which typically would not lead to the hemochromatosis phenotype  we discussed that we are going to hold phlebotomies and obtain MRI of the liver to assess iron content/deposition   the other most likely culprit for the high ferritin seems to be alcohol intake   absolute alcohol abstinence advised      8/30/24-Patient reported right leg pain that started approximately 5 weeks ago. States feels like a bad omar horse in his calf. He denies any known trauma to the leg, no recent log distance travel. Stopped walking on the treadmill, due to being so busy around the house and from the leg pain. Mild SOB a few weeks ago, that has resolved. Denies any chest pain or pressure. Appetite is good, he is trying to stay hydrated by drinking plenty of water. Chronic lower back pain, and right hip pain unchanged, manages with OTC ibuprofen, as needed. Admits to consuming alcohol again mainly on the weekends, to cope with stress. Emphasized the importance of abstaining from consuming alcohol.      Ferritin remains elevated but stable at 496. Discussed that we continue to hold of on proceeding with phlebotomies at this time. Will recheck labs in 4-5 months and consider phlebotomy if Ferritin remains >500.       Discussed ordering a right venous doppler to rule out a blood clot, pt is agreeable, will schedule accordingly. Educated patient on signs and symptoms that warrant urgent care.     5/2/25: Ferritin is slightly more elevated at 635. Patient recently fell after drinking, he doesn't recall anything with regard to falling, he passed out, butt was severely bruised, believe he may have hit his head due to tenderness, and expressed having a migraine for several days after, however did not seek medical attention and all symptoms have resolved. Educated patient on risk of bleeding while on a blood thinner, and advised him to seek medical attention if he should fall.     He is under an immense  amount of stress taking care of multiple grandchildren. He tends to manage his stress with alcohol. Discussed healthier alternatives to manage stress such as walking in nature or on the treadmill. Also, recommended further discussing with PCP. He gained weight around Easter time, and has since gotten back on his plan of trying to lose weight, by eating smaller portions and increasing his walking.   Denies any increased join/pain. Remains to have a significant amount of family stress with raising multiple grand children.      Discussed holding on phlebotomies at this time. Encouraged to decrease/refrain from consuming alcoholic beverages, remain adequate hydration. Will continue to monitor labs at this time.         2. DVT/PE:  9/3/24: Notified regarding Doppler results- positive for acute occlusive DVT in the gastrocnemius veins in the calf, also reported an age indeterminate DVT visualized in the popliteal vein and chronic changes visualized in the proximal femoral, mid femoral and distal femoral veins.    - CT reviewed- acute occlusive pulmonary embolism involving segmental and subsegmental posteromedial right lower lobe pulmonary arteries. Mildly elevated RV/LV ratio 1.22, indicating an element of right heart strain  - Eliquis 5mg (2 tablets (10 mg) by mouth 2 times       9/27/24  - patient is in office for follow-up since starting Eliquis  - denies any abnormal bleeding/bruising  - denies any new clotting events or concerns  - remains to have cramping pain in his right LLE  - discussed continuation of Eliquis indefinitely at this time  - will continue to monitor patient for bleeding risks     5/2/25: Remains taking Eliquis 5mg (1) tablet twice daily.  Denies any new clotting/bleeding events. Overall reports he feels well today. Discussed continuation of monitoring for bleeding risks. His recent labs look stable, no indication of bleeding. Will continue to monitor labs at this time.         Plan:   Discussed and  reviewed medical history   Reviewed recent labs- CBC is stable, Ferritin has increased to 635  Highly encourage abstaining from alcohol consumption  Will continue to monitor labs at this time  Return to see APRN with labs prior 4-5 months     (CBC w/diff, CMP, Ferritin, Iron Panel, Retic, B12, Folate)      Patient is aware on the interm to contact the office with any question or concerns.          Alpa James, APRN-CNP

## 2025-05-02 ENCOUNTER — OFFICE VISIT (OUTPATIENT)
Dept: HEMATOLOGY/ONCOLOGY | Facility: CLINIC | Age: 65
End: 2025-05-02
Payer: COMMERCIAL

## 2025-05-02 VITALS
OXYGEN SATURATION: 97 % | SYSTOLIC BLOOD PRESSURE: 129 MMHG | DIASTOLIC BLOOD PRESSURE: 80 MMHG | TEMPERATURE: 97.7 F | RESPIRATION RATE: 16 BRPM | HEART RATE: 95 BPM | BODY MASS INDEX: 41.28 KG/M2 | WEIGHT: 296 LBS

## 2025-05-02 DIAGNOSIS — M79.604 RIGHT LEG PAIN: ICD-10-CM

## 2025-05-02 DIAGNOSIS — E53.8 LOW SERUM VITAMIN B12: ICD-10-CM

## 2025-05-02 DIAGNOSIS — R79.89 ELEVATED FERRITIN: ICD-10-CM

## 2025-05-02 PROCEDURE — 1159F MED LIST DOCD IN RCRD: CPT

## 2025-05-02 PROCEDURE — 3079F DIAST BP 80-89 MM HG: CPT

## 2025-05-02 PROCEDURE — 1125F AMNT PAIN NOTED PAIN PRSNT: CPT

## 2025-05-02 PROCEDURE — 99214 OFFICE O/P EST MOD 30 MIN: CPT

## 2025-05-02 PROCEDURE — 3074F SYST BP LT 130 MM HG: CPT

## 2025-05-02 ASSESSMENT — PAIN SCALES - GENERAL: PAINLEVEL_OUTOF10: 4

## 2025-05-02 NOTE — PROGRESS NOTES
Pt instructed to gt labs at the [facility] 9/29  Rt 10/2 1030 for CNP visit  Reviewed AVS with patient- patient verbalizes understanding

## 2025-05-02 NOTE — PATIENT INSTRUCTIONS
Discussed and reviewed medical history   Reviewed recent labs- CBC is stable, Ferritin has increased to 635  Highly encourage abstaining from alcohol consumption  Will continue to monitor labs at this time  Return to see APRN with labs prior 4-5 months     (CBC w/diff, CMP, Ferritin, Iron Panel, Retic, B12, Folate)

## 2025-07-09 DIAGNOSIS — E03.9 ACQUIRED HYPOTHYROIDISM: ICD-10-CM

## 2025-07-09 RX ORDER — LEVOTHYROXINE SODIUM 200 UG/1
200 TABLET ORAL DAILY
Qty: 30 TABLET | Refills: 11 | Status: SHIPPED | OUTPATIENT
Start: 2025-07-09 | End: 2026-07-04

## 2025-07-09 NOTE — PATIENT INSTRUCTIONS

## 2025-07-12 LAB
ALBUMIN SERPL-MCNC: 4.4 G/DL (ref 3.6–5.1)
ALP SERPL-CCNC: 63 U/L (ref 35–144)
ALT SERPL-CCNC: 30 U/L (ref 9–46)
ANION GAP SERPL CALCULATED.4IONS-SCNC: 11 MMOL/L (CALC) (ref 7–17)
AST SERPL-CCNC: 28 U/L (ref 10–35)
BASOPHILS # BLD AUTO: 41 CELLS/UL (ref 0–200)
BASOPHILS NFR BLD AUTO: 0.9 %
BILIRUB SERPL-MCNC: 0.6 MG/DL (ref 0.2–1.2)
BUN SERPL-MCNC: 29 MG/DL (ref 7–25)
CALCIUM SERPL-MCNC: 9.1 MG/DL (ref 8.6–10.3)
CHLORIDE SERPL-SCNC: 104 MMOL/L (ref 98–110)
CHOLEST SERPL-MCNC: 236 MG/DL
CHOLEST/HDLC SERPL: 3.6 (CALC)
CO2 SERPL-SCNC: 24 MMOL/L (ref 20–32)
CREAT SERPL-MCNC: 1.05 MG/DL (ref 0.7–1.35)
EGFRCR SERPLBLD CKD-EPI 2021: 78 ML/MIN/1.73M2
EOSINOPHIL # BLD AUTO: 230 CELLS/UL (ref 15–500)
EOSINOPHIL NFR BLD AUTO: 5.1 %
ERYTHROCYTE [DISTWIDTH] IN BLOOD BY AUTOMATED COUNT: 13.3 % (ref 11–15)
EST. AVERAGE GLUCOSE BLD GHB EST-MCNC: 114 MG/DL
EST. AVERAGE GLUCOSE BLD GHB EST-SCNC: 6.3 MMOL/L
GLUCOSE SERPL-MCNC: 100 MG/DL (ref 65–99)
HBA1C MFR BLD: 5.6 %
HCT VFR BLD AUTO: 47.5 % (ref 38.5–50)
HDLC SERPL-MCNC: 66 MG/DL
HGB BLD-MCNC: 15.8 G/DL (ref 13.2–17.1)
LDLC SERPL CALC-MCNC: 139 MG/DL (CALC)
LYMPHOCYTES # BLD AUTO: 1301 CELLS/UL (ref 850–3900)
LYMPHOCYTES NFR BLD AUTO: 28.9 %
MCH RBC QN AUTO: 33.7 PG (ref 27–33)
MCHC RBC AUTO-ENTMCNC: 33.3 G/DL (ref 32–36)
MCV RBC AUTO: 101.3 FL (ref 80–100)
MONOCYTES # BLD AUTO: 468 CELLS/UL (ref 200–950)
MONOCYTES NFR BLD AUTO: 10.4 %
NEUTROPHILS # BLD AUTO: 2462 CELLS/UL (ref 1500–7800)
NEUTROPHILS NFR BLD AUTO: 54.7 %
NONHDLC SERPL-MCNC: 170 MG/DL (CALC)
PLATELET # BLD AUTO: 203 THOUSAND/UL (ref 140–400)
PMV BLD REES-ECKER: 9.6 FL (ref 7.5–12.5)
POTASSIUM SERPL-SCNC: 3.6 MMOL/L (ref 3.5–5.3)
PROT SERPL-MCNC: 7 G/DL (ref 6.1–8.1)
RBC # BLD AUTO: 4.69 MILLION/UL (ref 4.2–5.8)
SODIUM SERPL-SCNC: 139 MMOL/L (ref 135–146)
T4 FREE SERPL-MCNC: 1 NG/DL (ref 0.8–1.8)
TRIGL SERPL-MCNC: 173 MG/DL
TSH SERPL-ACNC: 8.66 MIU/L (ref 0.4–4.5)
WBC # BLD AUTO: 4.5 THOUSAND/UL (ref 3.8–10.8)

## 2025-07-14 ENCOUNTER — APPOINTMENT (OUTPATIENT)
Dept: PRIMARY CARE | Facility: CLINIC | Age: 65
End: 2025-07-14
Payer: MEDICARE

## 2025-07-14 VITALS
SYSTOLIC BLOOD PRESSURE: 138 MMHG | WEIGHT: 307.2 LBS | HEIGHT: 71 IN | DIASTOLIC BLOOD PRESSURE: 88 MMHG | HEART RATE: 81 BPM | BODY MASS INDEX: 43.01 KG/M2

## 2025-07-14 DIAGNOSIS — Z12.11 SCREENING FOR COLON CANCER: ICD-10-CM

## 2025-07-14 DIAGNOSIS — M91.11 JUVENILE OSTEOCHONDROSIS OF HEAD OF RIGHT FEMUR (HHS-HCC): ICD-10-CM

## 2025-07-14 DIAGNOSIS — Z00.00 ROUTINE GENERAL MEDICAL EXAMINATION AT HEALTH CARE FACILITY: ICD-10-CM

## 2025-07-14 DIAGNOSIS — E66.813 CLASS 3 SEVERE OBESITY DUE TO EXCESS CALORIES WITH SERIOUS COMORBIDITY AND BODY MASS INDEX (BMI) OF 40.0 TO 44.9 IN ADULT: ICD-10-CM

## 2025-07-14 DIAGNOSIS — E78.00 HYPERCHOLESTEROLEMIA: ICD-10-CM

## 2025-07-14 DIAGNOSIS — Z71.89 ADVANCED CARE PLANNING/COUNSELING DISCUSSION: ICD-10-CM

## 2025-07-14 DIAGNOSIS — M16.11 OSTEOARTHRITIS OF RIGHT HIP, UNSPECIFIED OSTEOARTHRITIS TYPE: ICD-10-CM

## 2025-07-14 DIAGNOSIS — E03.9 ACQUIRED HYPOTHYROIDISM: ICD-10-CM

## 2025-07-14 DIAGNOSIS — R73.02 GLUCOSE INTOLERANCE (IMPAIRED GLUCOSE TOLERANCE): ICD-10-CM

## 2025-07-14 DIAGNOSIS — Z86.0100 HISTORY OF COLON POLYPS: ICD-10-CM

## 2025-07-14 DIAGNOSIS — Z86.711 HISTORY OF PULMONARY EMBOLUS (PE): ICD-10-CM

## 2025-07-14 DIAGNOSIS — Z12.5 SCREENING PSA (PROSTATE SPECIFIC ANTIGEN): ICD-10-CM

## 2025-07-14 DIAGNOSIS — Z86.718 HISTORY OF DVT OF LOWER EXTREMITY: ICD-10-CM

## 2025-07-14 DIAGNOSIS — I10 PRIMARY HYPERTENSION: ICD-10-CM

## 2025-07-14 DIAGNOSIS — Z00.00 MEDICARE ANNUAL WELLNESS VISIT, SUBSEQUENT: Primary | ICD-10-CM

## 2025-07-14 PROCEDURE — 1159F MED LIST DOCD IN RCRD: CPT | Performed by: PHYSICIAN ASSISTANT

## 2025-07-14 PROCEDURE — 1036F TOBACCO NON-USER: CPT | Performed by: PHYSICIAN ASSISTANT

## 2025-07-14 PROCEDURE — 1124F ACP DISCUSS-NO DSCNMKR DOCD: CPT | Performed by: PHYSICIAN ASSISTANT

## 2025-07-14 PROCEDURE — 99214 OFFICE O/P EST MOD 30 MIN: CPT | Performed by: PHYSICIAN ASSISTANT

## 2025-07-14 PROCEDURE — 1170F FXNL STATUS ASSESSED: CPT | Performed by: PHYSICIAN ASSISTANT

## 2025-07-14 PROCEDURE — G0439 PPPS, SUBSEQ VISIT: HCPCS | Performed by: PHYSICIAN ASSISTANT

## 2025-07-14 PROCEDURE — 99497 ADVNCD CARE PLAN 30 MIN: CPT | Performed by: PHYSICIAN ASSISTANT

## 2025-07-14 PROCEDURE — G0447 BEHAVIOR COUNSEL OBESITY 15M: HCPCS | Performed by: PHYSICIAN ASSISTANT

## 2025-07-14 PROCEDURE — 3079F DIAST BP 80-89 MM HG: CPT | Performed by: PHYSICIAN ASSISTANT

## 2025-07-14 PROCEDURE — 3075F SYST BP GE 130 - 139MM HG: CPT | Performed by: PHYSICIAN ASSISTANT

## 2025-07-14 PROCEDURE — 3008F BODY MASS INDEX DOCD: CPT | Performed by: PHYSICIAN ASSISTANT

## 2025-07-14 PROCEDURE — 1160F RVW MEDS BY RX/DR IN RCRD: CPT | Performed by: PHYSICIAN ASSISTANT

## 2025-07-14 ASSESSMENT — ACTIVITIES OF DAILY LIVING (ADL)
BATHING: INDEPENDENT
DOING_HOUSEWORK: INDEPENDENT
TAKING_MEDICATION: INDEPENDENT
GROCERY_SHOPPING: INDEPENDENT
MANAGING_FINANCES: INDEPENDENT
DRESSING: INDEPENDENT

## 2025-07-14 ASSESSMENT — ENCOUNTER SYMPTOMS
HEADACHES: 0
ARTHRALGIAS: 1
TREMORS: 0
EYE REDNESS: 0
SINUS PAIN: 0
BACK PAIN: 1
ABDOMINAL PAIN: 0
WOUND: 0
WHEEZING: 0
EYE DISCHARGE: 0
RHINORRHEA: 0
NUMBNESS: 0
NAUSEA: 0
SLEEP DISTURBANCE: 0
FATIGUE: 1
DIZZINESS: 0
FLANK PAIN: 0
DIARRHEA: 0
FREQUENCY: 0
SORE THROAT: 0
COUGH: 0
CONFUSION: 0
CONSTIPATION: 0
PALPITATIONS: 0
BRUISES/BLEEDS EASILY: 0
NECK PAIN: 0
CHEST TIGHTNESS: 0
CHILLS: 0
FEVER: 0
SHORTNESS OF BREATH: 0
VOMITING: 0

## 2025-07-14 ASSESSMENT — PATIENT HEALTH QUESTIONNAIRE - PHQ9
2. FEELING DOWN, DEPRESSED OR HOPELESS: NOT AT ALL
SUM OF ALL RESPONSES TO PHQ9 QUESTIONS 1 AND 2: 0
1. LITTLE INTEREST OR PLEASURE IN DOING THINGS: NOT AT ALL

## 2025-07-14 NOTE — ASSESSMENT & PLAN NOTE
Orders:    CBC and Auto Differential; Future    Comprehensive Metabolic Panel; Future    Hemoglobin A1C; Future    Thyroid Stimulating Hormone; Future    Thyroxine, Free; Future    Magnesium; Future    Prostate Specific Antigen, Screen; Future

## 2025-07-14 NOTE — PROGRESS NOTES
Subjective   Reason for Visit: Devon Medina is an 65 y.o. male here for a Medicare Wellness visit.     Past Medical, Surgical, and Family History reviewed and updated in chart.    Reviewed all medications by prescribing practitioner or clinical pharmacist (such as prescriptions, OTCs, herbal therapies and supplements) and documented in the medical record.    Advanced Care Planning   Diagnosis, treatment and prognosis discussed with patient.  Patient has capacity to make his/her own decision.  Patient DOES NOT have a living will.  Patient is advised to set one up and bring a copy of this documenation for the chart. >16 min spent with patient counseling      HPI  Subsequent Medicare wellness     LABS      Med check   hypothyroid -taking meds -225 mcg daily- (was off the 200 mcg dose for about 1 week so suspect this is why TSH is elevated- will monitor)  Hx of PE/DVT - on blood thinner   HTN - home readings are typically 110-130s/70-80  mag - cont on supplement   glaucoma - on drop and following with dr colin fu- R - stable- following with ortho/pod on occasion   elevated ferritin - additional labs done and he was referred to kenneth who he is following with at this time.   LE edema /lymphedmea /chronic venous insuff - following with lymphedema clinic in [place] and has moses hose and overall back to good baseline - not on lasix/K at this time - discussed water therapy but didn't want to do in [place] - is worried about the cost     Preventative testing   PSA - DEC 2024   colonoscopy - oct 2022 -polyps - repeat in 3 years   Depression Screen - PHQ2 NEG July 2025    Fall - NEG July 2025      flu shot given Jan 2025 - in office - tolerated well      Over 15 minutes was spent discussing obesity - how this affects lab results, complicates medical problems and discussed various treatment options including diet and exercise and medications if needed       Patient Care Team:  Alpa Saini PA-C as PCP -  "General (Internal Medicine)  RUBEN Fox as PCP - [facility] PCP  RUBEN Fox as PCP - [facility] Medicaid PCP     Review of Systems   Constitutional:  Positive for fatigue. Negative for chills and fever.   HENT:  Negative for congestion, rhinorrhea, sinus pain, sore throat and tinnitus.    Eyes:  Negative for discharge, redness and visual disturbance.   Respiratory:  Negative for cough, chest tightness, shortness of breath and wheezing.    Cardiovascular:  Negative for chest pain, palpitations and leg swelling.   Gastrointestinal:  Negative for abdominal pain, constipation, diarrhea, nausea and vomiting.   Endocrine: Negative for cold intolerance and heat intolerance.   Genitourinary:  Negative for flank pain, frequency and urgency.   Musculoskeletal:  Positive for arthralgias, back pain and gait problem. Negative for neck pain.   Skin:  Negative for rash and wound.   Neurological:  Negative for dizziness, tremors, syncope, numbness and headaches.   Hematological:  Does not bruise/bleed easily.   Psychiatric/Behavioral:  Negative for confusion, sleep disturbance and suicidal ideas.        Objective   Vitals:  /88   Pulse 81   Ht 1.803 m (5' 11\")   Wt 139 kg (307 lb 3.2 oz)   BMI 42.85 kg/m²       Physical Exam  Vitals reviewed.   Constitutional:       Appearance: Normal appearance. He is obese.   HENT:      Head: Normocephalic.      Right Ear: External ear normal.      Left Ear: External ear normal.      Nose: Nose normal. No congestion or rhinorrhea.      Mouth/Throat:      Mouth: Mucous membranes are moist.   Eyes:      Extraocular Movements: Extraocular movements intact.      Conjunctiva/sclera: Conjunctivae normal.      Pupils: Pupils are equal, round, and reactive to light.   Cardiovascular:      Rate and Rhythm: Normal rate and regular rhythm.      Pulses: Normal pulses.   Pulmonary:      Effort: Pulmonary effort is normal.      Breath sounds: Normal breath sounds.   Abdominal: "      General: Bowel sounds are normal.      Palpations: Abdomen is soft.      Tenderness: There is no abdominal tenderness. There is no right CVA tenderness or left CVA tenderness.   Musculoskeletal:         General: Tenderness present. Normal range of motion.      Cervical back: Normal range of motion and neck supple. No tenderness.      Comments: R hip pain   With walking stick    Skin:     General: Skin is warm and dry.   Neurological:      General: No focal deficit present.      Mental Status: He is alert and oriented to person, place, and time.   Psychiatric:         Mood and Affect: Mood normal.         Behavior: Behavior normal.     Testing   Component      Latest Ref Rn 7/11/2025   WHITE BLOOD CELL COUNT      3.8 - 10.8 Thousand/uL 4.5    RED BLOOD CELL COUNT      4.20 - 5.80 Million/uL 4.69    HEMOGLOBIN      13.2 - 17.1 g/dL 15.8    HEMATOCRIT      38.5 - 50.0 % 47.5    MCV      80.0 - 100.0 fL 101.3 (H)    MCH      27.0 - 33.0 pg 33.7 (H)    MCHC      32.0 - 36.0 g/dL 33.3    RDW      11.0 - 15.0 % 13.3    PLATELET COUNT      140 - 400 Thousand/uL 203    MPV      7.5 - 12.5 fL 9.6    ABSOLUTE NEUTROPHILS      1,500 - 7,800 cells/uL 2,462    ABSOLUTE LYMPHOCYTES      850 - 3,900 cells/uL 1,301    ABSOLUTE MONOCYTES      200 - 950 cells/uL 468    ABSOLUTE EOSINOPHILS      15 - 500 cells/uL 230    ABSOLUTE BASOPHILS      0 - 200 cells/uL 41    NEUTROPHILS      % 54.7    LYMPHOCYTES      % 28.9    MONOCYTES      % 10.4    EOSINOPHILS      % 5.1    BASOPHILS      % 0.9    GLUCOSE      65 - 99 mg/dL 100 (H)    UREA NITROGEN (BUN)      7 - 25 mg/dL 29 (H)    CREATININE      0.70 - 1.35 mg/dL 1.05    EGFR      > OR = 60 mL/min/1.73m2 78    SODIUM      135 - 146 mmol/L 139    POTASSIUM      3.5 - 5.3 mmol/L 3.6    CHLORIDE      98 - 110 mmol/L 104    CARBON DIOXIDE      20 - 32 mmol/L 24    ELECTROLYTE BALANCE      7 - 17 mmol/L (calc) 11    CALCIUM      8.6 - 10.3 mg/dL 9.1    PROTEIN, TOTAL      6.1 - 8.1 g/dL  7.0    ALBUMIN      3.6 - 5.1 g/dL 4.4    BILIRUBIN, TOTAL      0.2 - 1.2 mg/dL 0.6    ALKALINE PHOSPHATASE      35 - 144 U/L 63    AST      10 - 35 U/L 28    ALT      9 - 46 U/L 30    CHOLESTEROL, TOTAL      <200 mg/dL 236 (H)    HDL CHOLESTEROL      > OR = 40 mg/dL 66    TRIGLYCERIDES      <150 mg/dL 173 (H)    LDL-CHOLESTEROL      mg/dL (calc) 139 (H)    CHOL/HDLC RATIO      <5.0 (calc) 3.6    NON HDL CHOLESTEROL      <130 mg/dL (calc) 170 (H)    HEMOGLOBIN A1c      <5.7 % 5.6    eAG (mg/dL)      mg/dL 114    eAG (mmol/L)      mmol/L 6.3    T4, FREE      0.8 - 1.8 ng/dL 1.0    TSH      0.40 - 4.50 mIU/L 8.66 (H)       Impression     MDM    1) COMPLEXITY: MORE THAN 1 STABLE CHRONIC CONDITION ADDRESSED  2)DATA: TESTS INTERPRETED AND OR ORDERED, TOOK INDEPENDENT HISTORY OR RECORDS REVIEWED  3)RISK: MODERATE RISK DUE TO NATURE OF MEDICAL CONDITIONS/COMORBIDITY OR MEDICATIONS ORDERED OR SURGICAL OR PROCEDURE REFERRAL, .     Reviewed labs and Testing on file   Patient to follow diet low in cholesterol, fat, and sodium.    Patient is advised to increase Exercise.  Patient is recommended to lose weight.  Reviewed Meds and discussed common side effects  Continue as directed   Patient is strongly advised to be compliant with recommendations.    Return to Clinic sooner if needed.  Patient denies further questions/concerns at this time         Assessment & Plan  Routine general medical examination at health care facility         Glucose intolerance (impaired glucose tolerance)    Orders:    CBC and Auto Differential; Future    Comprehensive Metabolic Panel; Future    Hemoglobin A1C; Future    Thyroid Stimulating Hormone; Future    Thyroxine, Free; Future    Magnesium; Future    Prostate Specific Antigen, Screen; Future    Primary hypertension    Orders:    CBC and Auto Differential; Future    Comprehensive Metabolic Panel; Future    Hemoglobin A1C; Future    Thyroid Stimulating Hormone; Future    Thyroxine, Free; Future     Magnesium; Future    Prostate Specific Antigen, Screen; Future    Acquired hypothyroidism    Orders:    Thyroid Stimulating Hormone; Future    Thyroxine, Free; Future    Screening PSA (prostate specific antigen)    Orders:    Prostate Specific Antigen, Screen; Future    Hypercholesterolemia         History of colon polyps    Orders:    Colonoscopy Screening; High Risk Patient; hx of polyps - 3 year repeat; Future    Screening for colon cancer    Orders:    Colonoscopy Screening; High Risk Patient; hx of polyps - 3 year repeat; Future    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)    Orders:    Disability Placard    Osteoarthritis of right hip, unspecified osteoarthritis type    Orders:    Disability Placard    History of pulmonary embolus (PE)         History of DVT of lower extremity         Class 3 severe obesity due to excess calories with serious comorbidity and body mass index (BMI) of 40.0 to 44.9 in adult         Medicare annual wellness visit, subsequent         Advanced care planning/counseling discussion                     FU in 6 mo with medicare wellness and labs at [facility] fasting and med check   Colonoscopy - 3 year repeat end of oct - hx of polyps

## 2025-08-06 DIAGNOSIS — I10 PRIMARY HYPERTENSION: ICD-10-CM

## 2025-08-06 RX ORDER — LOSARTAN POTASSIUM 50 MG/1
50 TABLET ORAL 2 TIMES DAILY
Qty: 60 TABLET | Refills: 5 | Status: SHIPPED | OUTPATIENT
Start: 2025-08-06

## 2025-12-05 ENCOUNTER — APPOINTMENT (OUTPATIENT)
Dept: CARDIOLOGY | Facility: CLINIC | Age: 65
End: 2025-12-05
Payer: COMMERCIAL

## 2026-01-14 ENCOUNTER — APPOINTMENT (OUTPATIENT)
Dept: PRIMARY CARE | Facility: CLINIC | Age: 66
End: 2026-01-14
Payer: COMMERCIAL